# Patient Record
Sex: MALE | Race: WHITE | NOT HISPANIC OR LATINO | Employment: OTHER | URBAN - METROPOLITAN AREA
[De-identification: names, ages, dates, MRNs, and addresses within clinical notes are randomized per-mention and may not be internally consistent; named-entity substitution may affect disease eponyms.]

---

## 2024-07-24 ENCOUNTER — HOSPITAL ENCOUNTER (OUTPATIENT)
Dept: RADIOLOGY | Facility: MEDICAL CENTER | Age: 74
End: 2024-07-24
Payer: COMMERCIAL

## 2024-07-24 ENCOUNTER — HOSPITAL ENCOUNTER (EMERGENCY)
Facility: MEDICAL CENTER | Age: 74
End: 2024-07-25

## 2024-07-24 ENCOUNTER — HOSPITAL ENCOUNTER (INPATIENT)
Facility: MEDICAL CENTER | Age: 74
LOS: 4 days | End: 2024-07-29
Attending: STUDENT IN AN ORGANIZED HEALTH CARE EDUCATION/TRAINING PROGRAM | Admitting: STUDENT IN AN ORGANIZED HEALTH CARE EDUCATION/TRAINING PROGRAM
Payer: COMMERCIAL

## 2024-07-24 DIAGNOSIS — K52.9 IBD (INFLAMMATORY BOWEL DISEASE): ICD-10-CM

## 2024-07-24 DIAGNOSIS — I48.91 ATRIAL FIBRILLATION WITH RVR (HCC): ICD-10-CM

## 2024-07-24 DIAGNOSIS — K56.609 SBO (SMALL BOWEL OBSTRUCTION) (HCC): ICD-10-CM

## 2024-07-24 DIAGNOSIS — K56.609 SMALL BOWEL OBSTRUCTION (HCC): ICD-10-CM

## 2024-07-24 PROCEDURE — 99285 EMERGENCY DEPT VISIT HI MDM: CPT

## 2024-07-24 ASSESSMENT — PAIN DESCRIPTION - PAIN TYPE: TYPE: ACUTE PAIN

## 2024-07-25 ENCOUNTER — HOSPITAL ENCOUNTER (OUTPATIENT)
Dept: RADIOLOGY | Facility: MEDICAL CENTER | Age: 74
End: 2024-07-25
Payer: COMMERCIAL

## 2024-07-25 ENCOUNTER — APPOINTMENT (OUTPATIENT)
Dept: RADIOLOGY | Facility: MEDICAL CENTER | Age: 74
End: 2024-07-25
Payer: COMMERCIAL

## 2024-07-25 ENCOUNTER — APPOINTMENT (OUTPATIENT)
Dept: RADIOLOGY | Facility: MEDICAL CENTER | Age: 74
End: 2024-07-25
Attending: INTERNAL MEDICINE
Payer: COMMERCIAL

## 2024-07-25 PROBLEM — A41.9 SEPSIS (HCC): Status: ACTIVE | Noted: 2024-07-25

## 2024-07-25 PROBLEM — K52.9 IBD (INFLAMMATORY BOWEL DISEASE): Status: ACTIVE | Noted: 2024-07-25

## 2024-07-25 PROBLEM — N28.9 RENAL LESION: Status: ACTIVE | Noted: 2024-07-25

## 2024-07-25 PROBLEM — K56.609 SBO (SMALL BOWEL OBSTRUCTION) (HCC): Status: ACTIVE | Noted: 2024-07-25

## 2024-07-25 PROBLEM — Z71.89 ACP (ADVANCE CARE PLANNING): Status: ACTIVE | Noted: 2024-07-25

## 2024-07-25 PROBLEM — N17.9 AKI (ACUTE KIDNEY INJURY) (HCC): Status: ACTIVE | Noted: 2024-07-25

## 2024-07-25 LAB
ALBUMIN SERPL BCP-MCNC: 3.2 G/DL (ref 3.2–4.9)
ALBUMIN/GLOB SERPL: 1 G/DL
ALP SERPL-CCNC: 106 U/L (ref 30–99)
ALT SERPL-CCNC: 17 U/L (ref 2–50)
ANION GAP SERPL CALC-SCNC: 14 MMOL/L (ref 7–16)
APPEARANCE UR: CLEAR
AST SERPL-CCNC: 14 U/L (ref 12–45)
BASOPHILS # BLD AUTO: 0.4 % (ref 0–1.8)
BASOPHILS # BLD: 0.04 K/UL (ref 0–0.12)
BILIRUB SERPL-MCNC: 0.5 MG/DL (ref 0.1–1.5)
BILIRUB UR QL STRIP.AUTO: NEGATIVE
BUN SERPL-MCNC: 16 MG/DL (ref 8–22)
CALCIUM ALBUM COR SERPL-MCNC: 8.8 MG/DL (ref 8.5–10.5)
CALCIUM SERPL-MCNC: 8.2 MG/DL (ref 8.5–10.5)
CHLORIDE SERPL-SCNC: 102 MMOL/L (ref 96–112)
CO2 SERPL-SCNC: 20 MMOL/L (ref 20–33)
COLOR UR: YELLOW
CREAT SERPL-MCNC: 1.34 MG/DL (ref 0.5–1.4)
CREAT UR-MCNC: 152.76 MG/DL
CRP SERPL HS-MCNC: 3.83 MG/DL (ref 0–0.75)
EKG IMPRESSION: NORMAL
EOSINOPHIL # BLD AUTO: 0 K/UL (ref 0–0.51)
EOSINOPHIL NFR BLD: 0 % (ref 0–6.9)
ERYTHROCYTE [DISTWIDTH] IN BLOOD BY AUTOMATED COUNT: 43.8 FL (ref 35.9–50)
ERYTHROCYTE [SEDIMENTATION RATE] IN BLOOD BY WESTERGREN METHOD: 16 MM/HOUR (ref 0–20)
GFR SERPLBLD CREATININE-BSD FMLA CKD-EPI: 56 ML/MIN/1.73 M 2
GLOBULIN SER CALC-MCNC: 3.3 G/DL (ref 1.9–3.5)
GLUCOSE SERPL-MCNC: 143 MG/DL (ref 65–99)
GLUCOSE UR STRIP.AUTO-MCNC: NEGATIVE MG/DL
HCT VFR BLD AUTO: 40.9 % (ref 42–52)
HGB BLD-MCNC: 13.9 G/DL (ref 14–18)
IMM GRANULOCYTES # BLD AUTO: 0.09 K/UL (ref 0–0.11)
IMM GRANULOCYTES NFR BLD AUTO: 0.8 % (ref 0–0.9)
INR PPP: 1.2 (ref 0.87–1.13)
KETONES UR STRIP.AUTO-MCNC: 40 MG/DL
LACTATE SERPL-SCNC: 1 MMOL/L (ref 0.5–2)
LEUKOCYTE ESTERASE UR QL STRIP.AUTO: NEGATIVE
LIPASE SERPL-CCNC: 18 U/L (ref 11–82)
LYMPHOCYTES # BLD AUTO: 0.56 K/UL (ref 1–4.8)
LYMPHOCYTES NFR BLD: 4.9 % (ref 22–41)
MCH RBC QN AUTO: 29.1 PG (ref 27–33)
MCHC RBC AUTO-ENTMCNC: 34 G/DL (ref 32.3–36.5)
MCV RBC AUTO: 85.7 FL (ref 81.4–97.8)
MICRO URNS: ABNORMAL
MONOCYTES # BLD AUTO: 0.08 K/UL (ref 0–0.85)
MONOCYTES NFR BLD AUTO: 0.7 % (ref 0–13.4)
NEUTROPHILS # BLD AUTO: 10.63 K/UL (ref 1.82–7.42)
NEUTROPHILS NFR BLD: 93.2 % (ref 44–72)
NITRITE UR QL STRIP.AUTO: NEGATIVE
NRBC # BLD AUTO: 0 K/UL
NRBC BLD-RTO: 0 /100 WBC (ref 0–0.2)
PH UR STRIP.AUTO: 6.5 [PH] (ref 5–8)
PLATELET # BLD AUTO: 384 K/UL (ref 164–446)
PMV BLD AUTO: 9.1 FL (ref 9–12.9)
POTASSIUM SERPL-SCNC: 4.2 MMOL/L (ref 3.6–5.5)
PROT SERPL-MCNC: 6.5 G/DL (ref 6–8.2)
PROT UR QL STRIP: NEGATIVE MG/DL
PROTHROMBIN TIME: 15.4 SEC (ref 12–14.6)
RBC # BLD AUTO: 4.77 M/UL (ref 4.7–6.1)
RBC UR QL AUTO: NEGATIVE
SODIUM SERPL-SCNC: 136 MMOL/L (ref 135–145)
SODIUM UR-SCNC: <20 MMOL/L
SP GR UR STRIP.AUTO: >=1.045
UROBILINOGEN UR STRIP.AUTO-MCNC: 0.2 MG/DL
WBC # BLD AUTO: 11.4 K/UL (ref 4.8–10.8)

## 2024-07-25 PROCEDURE — 700111 HCHG RX REV CODE 636 W/ 250 OVERRIDE (IP): Performed by: STUDENT IN AN ORGANIZED HEALTH CARE EDUCATION/TRAINING PROGRAM

## 2024-07-25 PROCEDURE — 770001 HCHG ROOM/CARE - MED/SURG/GYN PRIV*

## 2024-07-25 PROCEDURE — 99497 ADVNCD CARE PLAN 30 MIN: CPT | Mod: 25 | Performed by: STUDENT IN AN ORGANIZED HEALTH CARE EDUCATION/TRAINING PROGRAM

## 2024-07-25 PROCEDURE — 36415 COLL VENOUS BLD VENIPUNCTURE: CPT

## 2024-07-25 PROCEDURE — 700101 HCHG RX REV CODE 250: Performed by: STUDENT IN AN ORGANIZED HEALTH CARE EDUCATION/TRAINING PROGRAM

## 2024-07-25 PROCEDURE — 96376 TX/PRO/DX INJ SAME DRUG ADON: CPT

## 2024-07-25 PROCEDURE — 96365 THER/PROPH/DIAG IV INF INIT: CPT

## 2024-07-25 PROCEDURE — 85652 RBC SED RATE AUTOMATED: CPT

## 2024-07-25 PROCEDURE — 99223 1ST HOSP IP/OBS HIGH 75: CPT | Mod: 25 | Performed by: STUDENT IN AN ORGANIZED HEALTH CARE EDUCATION/TRAINING PROGRAM

## 2024-07-25 PROCEDURE — 85610 PROTHROMBIN TIME: CPT

## 2024-07-25 PROCEDURE — 80053 COMPREHEN METABOLIC PANEL: CPT

## 2024-07-25 PROCEDURE — 700102 HCHG RX REV CODE 250 W/ 637 OVERRIDE(OP): Performed by: STUDENT IN AN ORGANIZED HEALTH CARE EDUCATION/TRAINING PROGRAM

## 2024-07-25 PROCEDURE — A9270 NON-COVERED ITEM OR SERVICE: HCPCS | Performed by: STUDENT IN AN ORGANIZED HEALTH CARE EDUCATION/TRAINING PROGRAM

## 2024-07-25 PROCEDURE — 700105 HCHG RX REV CODE 258: Performed by: STUDENT IN AN ORGANIZED HEALTH CARE EDUCATION/TRAINING PROGRAM

## 2024-07-25 PROCEDURE — 84300 ASSAY OF URINE SODIUM: CPT

## 2024-07-25 PROCEDURE — 82570 ASSAY OF URINE CREATININE: CPT

## 2024-07-25 PROCEDURE — 87040 BLOOD CULTURE FOR BACTERIA: CPT | Mod: 91

## 2024-07-25 PROCEDURE — 86140 C-REACTIVE PROTEIN: CPT

## 2024-07-25 PROCEDURE — 83690 ASSAY OF LIPASE: CPT

## 2024-07-25 PROCEDURE — 85025 COMPLETE CBC W/AUTO DIFF WBC: CPT

## 2024-07-25 PROCEDURE — 83605 ASSAY OF LACTIC ACID: CPT

## 2024-07-25 PROCEDURE — 81003 URINALYSIS AUTO W/O SCOPE: CPT

## 2024-07-25 PROCEDURE — 96375 TX/PRO/DX INJ NEW DRUG ADDON: CPT

## 2024-07-25 PROCEDURE — 93005 ELECTROCARDIOGRAM TRACING: CPT | Performed by: STUDENT IN AN ORGANIZED HEALTH CARE EDUCATION/TRAINING PROGRAM

## 2024-07-25 PROCEDURE — 99222 1ST HOSP IP/OBS MODERATE 55: CPT | Performed by: SURGERY

## 2024-07-25 RX ORDER — TRAZODONE HYDROCHLORIDE 100 MG/1
200 TABLET ORAL NIGHTLY
COMMUNITY

## 2024-07-25 RX ORDER — AMOXICILLIN 250 MG
2 CAPSULE ORAL EVERY EVENING
Status: DISCONTINUED | OUTPATIENT
Start: 2024-07-25 | End: 2024-07-29 | Stop reason: HOSPADM

## 2024-07-25 RX ORDER — ACYCLOVIR 400 MG/1
400 TABLET ORAL 2 TIMES DAILY
COMMUNITY

## 2024-07-25 RX ORDER — METRONIDAZOLE 500 MG/100ML
500 INJECTION, SOLUTION INTRAVENOUS EVERY 12 HOURS
Status: DISCONTINUED | OUTPATIENT
Start: 2024-07-25 | End: 2024-07-27

## 2024-07-25 RX ORDER — POLYETHYLENE GLYCOL 3350 17 G/17G
1 POWDER, FOR SOLUTION ORAL
Status: DISCONTINUED | OUTPATIENT
Start: 2024-07-25 | End: 2024-07-25

## 2024-07-25 RX ORDER — TAMSULOSIN HYDROCHLORIDE 0.4 MG/1
0.4 CAPSULE ORAL
COMMUNITY

## 2024-07-25 RX ORDER — ONDANSETRON 4 MG/1
4 TABLET, ORALLY DISINTEGRATING ORAL EVERY 8 HOURS PRN
Status: ON HOLD | COMMUNITY
End: 2024-07-29

## 2024-07-25 RX ORDER — ONDANSETRON 4 MG/1
4 TABLET, ORALLY DISINTEGRATING ORAL EVERY 4 HOURS PRN
Status: DISCONTINUED | OUTPATIENT
Start: 2024-07-25 | End: 2024-07-29 | Stop reason: HOSPADM

## 2024-07-25 RX ORDER — BUPROPION HYDROCHLORIDE 100 MG/1
100 TABLET ORAL 2 TIMES DAILY
COMMUNITY

## 2024-07-25 RX ORDER — OXYCODONE HYDROCHLORIDE 5 MG/1
5 TABLET ORAL
Status: DISCONTINUED | OUTPATIENT
Start: 2024-07-25 | End: 2024-07-28

## 2024-07-25 RX ORDER — ACETAMINOPHEN 325 MG/1
650 TABLET ORAL EVERY 6 HOURS PRN
Status: DISCONTINUED | OUTPATIENT
Start: 2024-07-25 | End: 2024-07-29 | Stop reason: HOSPADM

## 2024-07-25 RX ORDER — M-VIT,TX,IRON,MINS/CALC/FOLIC 27MG-0.4MG
1 TABLET ORAL DAILY
COMMUNITY

## 2024-07-25 RX ORDER — SODIUM CHLORIDE, SODIUM LACTATE, POTASSIUM CHLORIDE, CALCIUM CHLORIDE 600; 310; 30; 20 MG/100ML; MG/100ML; MG/100ML; MG/100ML
INJECTION, SOLUTION INTRAVENOUS CONTINUOUS
Status: DISCONTINUED | OUTPATIENT
Start: 2024-07-25 | End: 2024-07-29 | Stop reason: HOSPADM

## 2024-07-25 RX ORDER — ONDANSETRON 2 MG/ML
4 INJECTION INTRAMUSCULAR; INTRAVENOUS EVERY 4 HOURS PRN
Status: DISCONTINUED | OUTPATIENT
Start: 2024-07-25 | End: 2024-07-29 | Stop reason: HOSPADM

## 2024-07-25 RX ORDER — SILDENAFIL 100 MG/1
100 TABLET, FILM COATED ORAL
COMMUNITY

## 2024-07-25 RX ORDER — BUSPIRONE HYDROCHLORIDE 10 MG/1
10 TABLET ORAL 2 TIMES DAILY
COMMUNITY

## 2024-07-25 RX ORDER — OXYCODONE HYDROCHLORIDE 10 MG/1
10 TABLET ORAL
Status: DISCONTINUED | OUTPATIENT
Start: 2024-07-25 | End: 2024-07-28

## 2024-07-25 RX ORDER — PANTOPRAZOLE SODIUM 40 MG/10ML
40 INJECTION, POWDER, LYOPHILIZED, FOR SOLUTION INTRAVENOUS 2 TIMES DAILY
Status: DISCONTINUED | OUTPATIENT
Start: 2024-07-25 | End: 2024-07-27

## 2024-07-25 RX ORDER — HYDROMORPHONE HYDROCHLORIDE 1 MG/ML
0.5 INJECTION, SOLUTION INTRAMUSCULAR; INTRAVENOUS; SUBCUTANEOUS
Status: DISCONTINUED | OUTPATIENT
Start: 2024-07-25 | End: 2024-07-28

## 2024-07-25 RX ORDER — LABETALOL HYDROCHLORIDE 5 MG/ML
10 INJECTION, SOLUTION INTRAVENOUS EVERY 4 HOURS PRN
Status: DISCONTINUED | OUTPATIENT
Start: 2024-07-25 | End: 2024-07-29 | Stop reason: HOSPADM

## 2024-07-25 RX ORDER — SODIUM CHLORIDE, SODIUM LACTATE, POTASSIUM CHLORIDE, AND CALCIUM CHLORIDE .6; .31; .03; .02 G/100ML; G/100ML; G/100ML; G/100ML
500 INJECTION, SOLUTION INTRAVENOUS
Status: DISCONTINUED | OUTPATIENT
Start: 2024-07-25 | End: 2024-07-29 | Stop reason: HOSPADM

## 2024-07-25 RX ADMIN — METRONIDAZOLE 500 MG: 500 INJECTION, SOLUTION INTRAVENOUS at 08:45

## 2024-07-25 RX ADMIN — CEFTRIAXONE SODIUM 2000 MG: 10 INJECTION, POWDER, FOR SOLUTION INTRAVENOUS at 20:13

## 2024-07-25 RX ADMIN — PANTOPRAZOLE SODIUM 40 MG: 40 INJECTION, POWDER, FOR SOLUTION INTRAVENOUS at 17:55

## 2024-07-25 RX ADMIN — SENNOSIDES AND DOCUSATE SODIUM 2 TABLET: 50; 8.6 TABLET ORAL at 17:58

## 2024-07-25 RX ADMIN — SODIUM CHLORIDE, POTASSIUM CHLORIDE, SODIUM LACTATE AND CALCIUM CHLORIDE: 600; 310; 30; 20 INJECTION, SOLUTION INTRAVENOUS at 18:58

## 2024-07-25 RX ADMIN — HYDROMORPHONE HYDROCHLORIDE 0.5 MG: 1 INJECTION, SOLUTION INTRAMUSCULAR; INTRAVENOUS; SUBCUTANEOUS at 18:23

## 2024-07-25 RX ADMIN — SODIUM CHLORIDE, POTASSIUM CHLORIDE, SODIUM LACTATE AND CALCIUM CHLORIDE: 600; 310; 30; 20 INJECTION, SOLUTION INTRAVENOUS at 03:08

## 2024-07-25 RX ADMIN — PANTOPRAZOLE SODIUM 40 MG: 40 INJECTION, POWDER, FOR SOLUTION INTRAVENOUS at 05:17

## 2024-07-25 RX ADMIN — METRONIDAZOLE 500 MG: 500 INJECTION, SOLUTION INTRAVENOUS at 20:15

## 2024-07-25 RX ADMIN — HYDROMORPHONE HYDROCHLORIDE 0.5 MG: 1 INJECTION, SOLUTION INTRAMUSCULAR; INTRAVENOUS; SUBCUTANEOUS at 12:42

## 2024-07-25 RX ADMIN — HYDROMORPHONE HYDROCHLORIDE 0.5 MG: 1 INJECTION, SOLUTION INTRAMUSCULAR; INTRAVENOUS; SUBCUTANEOUS at 05:22

## 2024-07-25 RX ADMIN — OXYCODONE HYDROCHLORIDE 10 MG: 10 TABLET ORAL at 09:44

## 2024-07-25 ASSESSMENT — PAIN DESCRIPTION - PAIN TYPE
TYPE: ACUTE PAIN

## 2024-07-25 ASSESSMENT — ENCOUNTER SYMPTOMS
DEPRESSION: 0
ABDOMINAL PAIN: 1
CHILLS: 0
PALPITATIONS: 0
COUGH: 0
FOCAL WEAKNESS: 0
HEADACHES: 0
DIZZINESS: 0
WEAKNESS: 1
BRUISES/BLEEDS EASILY: 0
VOMITING: 1
FEVER: 0
DOUBLE VISION: 0
SHORTNESS OF BREATH: 0
BLURRED VISION: 0
MYALGIAS: 1
NAUSEA: 1
HEARTBURN: 1

## 2024-07-25 ASSESSMENT — LIFESTYLE VARIABLES: SUBSTANCE_ABUSE: 0

## 2024-07-25 NOTE — CONSULTS
"  DATE OF CONSULTATION:  7/25/2024     REFERRING PHYSICIAN:   Manuel Kearney M.D.     CONSULTING PHYSICIAN:  Enedina Portillo M.D.     REASON FOR CONSULTATION:  I have been asked by Dr. Kearney to see the patient in surgical consultation for evaluation of SBO.    HISTORY OF PRESENT ILLNESS: The patient is a 74  year-old White man who presents to the Emergency Department with a 2-day history of moderate generalized abdominal pain. The pain is associated with nausea and vomiting. The patient denies any recent or intercurrent illness. The patient has undergone colectomy and SB reservoir 30 years ago. The patient denies any previous surgery for obstructive symptoms.. He has had admissions in past for SBOs treated non operatively.    PAST MEDICAL HISTORY: Colitis/IBD    PAST SURGICAL HISTORY: Colectomy with continent reservoir.    ALLERGIES:   Allergies   Allergen Reactions    Amoxicillin Unspecified     \"It makes my ostomy bleed\"       CURRENT MEDICATIONS:    Home Medications       Reviewed by Joselin Navarro (Pharmacy Tech) on 07/25/24 at 0522  Med List Status: Complete     Medication Last Dose Status   acyclovir (ZOVIRAX) 400 MG tablet 7/24/2024 Active   buPROPion (WELLBUTRIN) 100 MG Tab 7/24/2024 Active   busPIRone (BUSPAR) 10 MG Tab tablet 7/24/2024 Active   LACTOBACILLUS PO 7/24/2024 Active   omeprazole (PRILOSEC) 20 MG delayed-release capsule 7/24/2024 Active   ondansetron (ZOFRAN ODT) 4 MG TABLET DISPERSIBLE PRN Active   sildenafil citrate (VIAGRA) 100 MG tablet PRN Active   tamsulosin (FLOMAX) 0.4 MG capsule UNK Active   traZODone (DESYREL) 100 MG Tab 7/23/2024 Active                    FAMILY HISTORY: family history is not on file.    SOCIAL HISTORY:      REVIEW OF SYSTEMS: Comprehensive review of systems is negative with the exception of the aforementioned HPI, PMH, and PSH bullets in accordance with CMS guidelines.    PHYSICAL EXAMINATION:    Physical Exam  Cardiovascular:      Rate and Rhythm: Normal rate. "   Pulmonary:      Effort: Pulmonary effort is normal.   Abdominal:      General: There is no distension.      Palpations: Abdomen is soft.      Tenderness: There is no abdominal tenderness.      Comments: [place] ostomy in RLQ   Musculoskeletal:         General: Normal range of motion.      Cervical back: Neck supple.   Skin:     General: Skin is warm.   Neurological:      General: No focal deficit present.      Mental Status: He is alert.         LABORATORY VALUES:   Recent Labs     07/25/24  0113   WBC 11.4*   RBC 4.77   HEMOGLOBIN 13.9*   HEMATOCRIT 40.9*   MCV 85.7   MCH 29.1   MCHC 34.0   RDW 43.8   PLATELETCT 384   MPV 9.1     Recent Labs     07/25/24  0113   SODIUM 136   POTASSIUM 4.2   CHLORIDE 102   CO2 20   GLUCOSE 143*   BUN 16   CREATININE 1.34   CALCIUM 8.2*     Recent Labs     07/25/24 0113 07/25/24  0204   ASTSGOT 14  --    ALTSGPT 17  --    TBILIRUBIN 0.5  --    ALKPHOSPHAT 106*  --    GLOBULIN 3.3  --    INR  --  1.20*     Recent Labs     07/25/24 0204   INR 1.20*        IMAGING:   No orders to display       ASSESSMENT AND PLAN:     * SBO (small bowel obstruction) (HCC)- (present on admission)  Assessment & Plan  Hx of IBD  SP colectomy and has reservoir  NV  [facility]  [facility] to [facility] for tx  CT still not loaded in chart to review  Non op management until eval CT        DISPOSITION: Medical evaluation and admission. The patient was admitted to the Medical Service prior to surgical consultation. [facility] Acute Care Surgery [place] Service will follow.     ____________________________________     Enedina Portillo M.D.    DD: 7/25/2024  6:03 AM    AAST Grading System for EGS Conditions  ACS NSQIP Surgical Risk Calculator

## 2024-07-25 NOTE — ED NOTES
Med rec complete per patient/home pharmacy  Allergies reviewed.   Outpatient antibiotics in the last 30 days? No   Anticoagulants taken in the last 14 days? No     [facility] reports [facility] Tamsulosin 0.4 daily, patient unsure if taking, medication did not sound familiar to him.    Pharmacy patient utilizes: [facility] [place]

## 2024-07-25 NOTE — ASSESSMENT & PLAN NOTE
This is Sepsis Present on admission  SIRS criteria identified on my evaluation include: Tachycardia, with heart rate greater than 90 BPM, Leukocytosis, with WBC greater than 12,000, and Bandemia, greater than 10% bands  Clinical indicators of end organ dysfunction include Lactic Acid greater than 2  Source is GI  Sepsis protocol initiated  Crystalloid Fluid Administration: Fluid resuscitation ordered per standard protocol - 30 mL/kg per current or ideal body weight  IV antibiotics as appropriate for source of sepsis  Reassessment: I have reassessed the patient's hemodynamic status    WBC 16.48k, LA 2.5  IVF  Abx: ceftriaxone, flagyl  Follow cultures

## 2024-07-25 NOTE — H&P
"Hospital Medicine History & Physical Note    Date of Service  7/25/2024    Primary Care Physician  No primary care provider on file.    Consultants  Surgery (Dr. Portillo)    Code Status  DNAR/DNI    Chief Complaint  Chief Complaint   Patient presents with    Abdominal Pain     [facility] with abd pain, CT revealed bowel obstruction. NG tube placed, pt given 0.5 mg dilaudid, 2L IVF, and \"unknown antibiotics\" x 2 PTA.        History of Presenting Illness  Babar Yuan is a 74 y.o. male with history of sanjiv continent intestinal reservoir (BCIR) 30years ago for colitis who presented 7/24/2024 [facility] for higher level of care. Patient reported having several days of nausea and vomiting, generalized weakness.  Therefore [facility], [facility] ER for higher level care, colorectal rectal surgery evaluation.    [facility] included:  CBC: WBC 16.48, hemoglobin 17.1, platelet 469  CMP: Sodium 133, potassium 4.4, chloride 94, bicarb 20, glucose 130, BUN 70, creatinine 1.6, calcium 10.4, total protein 8.3, albumin 4.1, alkaline phosphatase 124, AST 21, ALT 19, total bilirubin 1.1  Phosphorus 2.8  Magnesium 1.8  Lactic acid 2.5  Lipase 22  Procalcitonin 0.07  Beta hydroxybutyrate acid 2.0    CTAP with contrast: Markedly dilated distal bowel loops including dilatation of the iliac pouch increased from prior, with transition occurring at the distal pouch anastomotic suture.  Findings suggest obstruction, surgical consultation recommended.  A complex 1 cm left upper pole renal lesion suspicious for neoplasm such as a renal cell carcinoma.  Consider renal protocol imaging further evaluation.    Patient was given IVF antibiotic prior to transfer.  [facility]. At time of my evaluation, patient appears comfortable in no acute distress.  Surgery has been consulted, formal evaluation to follow.    I " "discussed the plan of care with patient, bedside RN, and pharmacy.    Review of Systems  Review of Systems   Constitutional:  Negative for chills and fever.   HENT:  Negative for hearing loss and tinnitus.    Eyes:  Negative for blurred vision and double vision.   Respiratory:  Negative for cough and shortness of breath.    Cardiovascular:  Negative for chest pain and palpitations.   Gastrointestinal:  Positive for abdominal pain, heartburn, nausea and vomiting.   Genitourinary:  Negative for dysuria and urgency.   Musculoskeletal:  Positive for myalgias. Negative for joint pain.   Skin:  Negative for itching and rash.   Neurological:  Positive for weakness. Negative for dizziness, focal weakness and headaches.   Endo/Heme/Allergies:  Negative for environmental allergies. Does not bruise/bleed easily.   Psychiatric/Behavioral:  Negative for depression and substance abuse.    All other systems reviewed and are negative.      Past Medical History   has no past medical history on file.  Colitis    Surgical History   has no past surgical history on file.   sanjiv continent intestinal reservoir    Family History  family history is not on file.   Family history reviewed with patient. There is no family history that is pertinent to the chief complaint.     Social History       Allergies  Allergies   Allergen Reactions    Amoxicillin Unspecified     \"It makes my ostomy bleed\"       Medications  None       Physical Exam  Temp:  [36.6 °C (97.9 °F)] 36.6 °C (97.9 °F)  Pulse:  [60-87] 60  Resp:  [14-20] 16  BP: (112-134)/(60-70) 117/63  SpO2:  [92 %-95 %] 95 %  Blood Pressure : 128/60   Temperature: 36.6 °C (97.9 °F)   Pulse: 82   Respiration: 20   Pulse Oximetry: 93 %       Physical Exam  Vitals and nursing note reviewed.   Constitutional:       General: He is not in acute distress.  HENT:      Head: Normocephalic and atraumatic.      Nose: Nose normal.      Comments: NG in place     Mouth/Throat:      Mouth: Mucous membranes " "are dry.      Pharynx: Oropharynx is clear.   Eyes:      General: No scleral icterus.     Extraocular Movements: Extraocular movements intact.   Cardiovascular:      Rate and Rhythm: Normal rate and regular rhythm.      Pulses: Normal pulses.      Heart sounds:      No friction rub.   Pulmonary:      Effort: No respiratory distress.      Breath sounds: Rales present. No wheezing.   Chest:      Chest wall: No tenderness.   Abdominal:      General: There is distension.      Tenderness: There is abdominal tenderness. There is no guarding or rebound.   Musculoskeletal:         General: Normal range of motion.      Cervical back: Neck supple. No tenderness.      Right lower leg: No edema.      Left lower leg: No edema.   Skin:     General: Skin is warm and dry.      Capillary Refill: Capillary refill takes less than 2 seconds.   Neurological:      General: No focal deficit present.      Mental Status: He is alert and oriented to person, place, and time.   Psychiatric:         Mood and Affect: Mood normal.         Laboratory:  Recent Labs     07/25/24  0113   WBC 11.4*   RBC 4.77   HEMOGLOBIN 13.9*   HEMATOCRIT 40.9*   MCV 85.7   MCH 29.1   MCHC 34.0   RDW 43.8   PLATELETCT 384   MPV 9.1     Recent Labs     07/25/24  0113   SODIUM 136   POTASSIUM 4.2   CHLORIDE 102   CO2 20   GLUCOSE 143*   BUN 16   CREATININE 1.34   CALCIUM 8.2*     Recent Labs     07/25/24  0113   ALTSGPT 17   ASTSGOT 14   ALKPHOSPHAT 106*   TBILIRUBIN 0.5   LIPASE 18   GLUCOSE 143*     Recent Labs     07/25/24  0204   INR 1.20*     No results for input(s): \"NTPROBNP\" in the last 72 hours.      No results for input(s): \"TROPONINT\" in the last 72 hours.    Imaging:  No orders to display       no X-Ray or EKG requiring interpretation    Assessment/Plan:  Justification for Admission Status  I anticipate this patient will require at least 2 midnights hospitalization, therefore appropriate for inpatient status      * SBO (small bowel obstruction) ([facility])- " (present on admission)  Assessment & Plan  [facility]  Pt had BCIR 30years ago for IBD/colitis    NPO, bowel rest  IV Protonix  Supportive pain control  NG decompression  Surgery consulted, follow-up appreciated    IBD (inflammatory bowel disease)  Assessment & Plan  Resume home medications once able to verify    Sepsis ([facility])  Assessment & Plan  This is Sepsis Present on admission  SIRS criteria identified on my evaluation include: Tachycardia, with heart rate greater than 90 BPM, Leukocytosis, with WBC greater than 12,000, and Bandemia, greater than 10% bands  Clinical indicators of end organ dysfunction include Lactic Acid greater than 2  Source is GI  Sepsis protocol initiated  Crystalloid Fluid Administration: Fluid resuscitation ordered per standard protocol - 30 mL/kg per current or ideal body weight  IV antibiotics as appropriate for source of sepsis  Reassessment: I have reassessed the patient's hemodynamic status    WBC 16.48k, LA 2.5  IVF  Abx: ceftriaxone, flagyl  Follow cultures    TRISTAN (acute kidney injury) ([facility])  Assessment & Plan  Creatinine 1.6, unknown baseline  IVF  Minimize nephrotoxic agents, renally dose meds  Check FeNa    Renal lesion  Assessment & Plan  Left sided 1cm renal lesion seen on CT AP  Discussed with patient, he is already aware of this. I advised to follow up with PCP outpatient, he expressed understanding    ACP (advance care planning)  Assessment & Plan  Goal care discussed with patient in ER.  He stated he does not wish to be kept alive on a machine in a vegetative state.  He also clarified he has advanced directive POLST at home.  Patient is agreeable for no invasive or heroic life-sustaining measures-no CPR/defibrillation/intubation mechanical ventilation.  He is however agreeable for [place] invasive medical management, including IVF, IV analgesia, IV antibiotic as clinically warranted, as well as evaluation by surgery service.  Diagnosis, prognosis, question  and concern addressed.  DNR/denies status confirmed.  ACP: 17 minutes        VTE prophylaxis: SCDs/TEDs

## 2024-07-25 NOTE — ASSESSMENT & PLAN NOTE
Creatinine 1.6, unknown baseline  IVF  Minimize nephrotoxic agents, renally dose meds  Check FeNa  Cr- now 1.3 improved

## 2024-07-25 NOTE — ED NOTES
Dr Mar bedside at this time.  ABX admin per MAR.  Pt resting comfortably.  Updated to POC, all needs met.

## 2024-07-25 NOTE — ED NOTES
1505  pt's daughter requesting results of testing[facility]records [facility].  Dr Mar aware via [facility] and spoke w/ daughter per phone.  Spoke w/ film room re: CT from [facility], aware pt had duplicate chart, has been marked for merge, CT uploaded on alternate MR#.      1639  Bed assigned to T3, report called, pt to the floor w/ transport.  250cc output from NG tube on this shift.

## 2024-07-25 NOTE — ASSESSMENT & PLAN NOTE
[facility]  Pt had BCIR 30years ago for IBD/colitis    NPO, bowel rest  IV Protonix  Supportive pain control  NG decompression  Surgery consulted, GI consulted enteroscopy through stoma recommended tonight  S/p enteroscopy with [facility].

## 2024-07-25 NOTE — ED PROVIDER NOTES
"  ER Provider Note    Scribed for Lanette Kearney M.D. by Kait Marley. 7/25/2024   1:36 AM    Primary Care Provider: No primary care provider noted.     CHIEF COMPLAINT  Chief Complaint   Patient presents with    Abdominal Pain     [facility], pt presented with abd pain, CT revealed bowel obstruction. NG tube placed, pt given 0.5 mg dilaudid, 2L IVF, and \"unknown antibiotics\" x 2 PTA.      EXTERNAL RECORDS REVIEWED  Inpatient Notes  Patient has a history of [place] continent intestinal reservoirs with a history of IBD. History suggests colitis. He underwent total colectomy without previous obstruction. Recent labs showed a creatine level of 1.6 with no elevation of BUN. The white blood cell count was 16.5. CT marked dilatation of distal bowel loops with iliac pouch increasing prior to transformation occurring at the distal pouch anastomosis sutures, findings suggest obstruction. Additionally, there is a noted 1 cm left upper pole renal lesion suspicion for neoplasm as renal cell carcinoma. He was referred to urology for follow-up. He received 2 liters of normal saline and was administered CTX Flagyl for elevated white blood cell count. Nasogastric tube placed; general surgery consulted but deemed the case too complicated and transferred pt for subspecialty evaluation    HPI/ROS  LIMITATION TO HISTORY   Select: None  OUTSIDE HISTORIAN(S):  None    Babar Yuan is a 74 y.o. male who presents to the ED for evaluation of abdominal pain onset prior to arrival. The patient explains he has been experiencing gas and lower abdominal pain, which he finds unbearable and has been ongoing for a few weeks. He mentions episodes of vomiting. The patient states prior colitis, which requires him to catheterize for bowel movements. He had surgery for this condition 30 years ago in [place] and denies any recent issues with it. He admits to not chewing his carrots thoroughly, which he was told a cause of an " "obstruction when [facility] visited [facility]. He denies feeling nauseous.    PAST MEDICAL HISTORY  No past medical history noted.     SURGICAL HISTORY  No past surgical history noted.   FAMILY HISTORY  No family history noted.   SOCIAL HISTORY       CURRENT MEDICATIONS  Previous Medications    No medications noted.        ALLERGIES  Allergies   Allergen Reactions    Amoxicillin Unspecified     \"It makes my ostomy bleed\"        PHYSICAL EXAM  /60   Pulse 82   Temp 36.6 °C (97.9 °F) (Oral)   Resp 20   Ht 1.651 m (5' 5\")   Wt 60.3 kg (133 lb)   SpO2 93%   BMI 22.13 kg/m²    General: Pleasant male, chronically ill-appearing, alert  Head: Normocephalic atraumatic  Eyes: Extraocular motion intact  Neck: Supple, no rigidity  Cardiovascular: Regular rate and rhythm no murmurs rubs or gallops  Respiratory: Clear to auscultation bilaterally, equal chest rise and fall, no increased work of breathing  Abdomen: Soft, mildly distended, right lower quadrant has a sinus tract, which patient reports he catheterizes intermittently to relieve stool.  Musculoskeletal: Warm and well perfused, no peripheral edema  Neuro: Alert, no focal deficits  Integumentary: No wounds or rashes    DIAGNOSTIC STUDIES    Labs:   Labs Reviewed   CBC WITH DIFFERENTIAL - Abnormal; Notable for the following components:       Result Value    WBC 11.4 (*)     Hemoglobin 13.9 (*)     Hematocrit 40.9 (*)     Neutrophils-Polys 93.20 (*)     Lymphocytes 4.90 (*)     Neutrophils (Absolute) 10.63 (*)     Lymphs (Absolute) 0.56 (*)     All other components within normal limits   COMP METABOLIC PANEL - Abnormal; Notable for the following components:    Glucose 143 (*)     Calcium 8.2 (*)     Alkaline Phosphatase 106 (*)     All other components within normal limits   ESTIMATED GFR - Abnormal; Notable for the following components:    GFR (CKD-EPI) 56 (*)     All other components within normal limits   LACTIC ACID   LIPASE   PROTHROMBIN TIME   BLOOD CULTURE   BLOOD " CULTURE   URINALYSIS   CRP QUANTITIVE (NON-CARDIAC)   SED RATE   URINE SODIUM RANDOM   URINE CREATININE RANDOM   Mild leukocytosis, improved from outside facility, where was 16.5, patient has chronic anemia, remainder labs, unremarkable.    INITIAL ASSESSMENT COURSE AND PLAN  Care Narrative  Patient was evaluated at bedside. The patient presents to the ED for abdominal pain. I discussed plan of admission. Patient verbalizes understanding and support with my plan of care.  Differential diagnoses include but not limited to: Small bowel obstruction, stricture of pouch outlet, dehydration    Discussed with Dr. Portillo who recommends medicine admission, they will consult/follow    1:53 AM- I spoke Dr. Robertson (Internal Medicine) for plan of admission. He was agreeable.     DISPOSITION AND DISCUSSIONS    I have discussed management of the patient with the following physicians and JOHNNY's:  Dr. Robertson (Internal Medicine).  Dr. Portillo,, general surgery    Discussion of management with other QHP or appropriate source(s): None       Barriers to care at this time, including but not limited to: Patient does not have established PCP.     Decision tools and prescription drugs considered including, but not limited to: Pain Medications hydromorphone .    DISPOSITION:  Patient will be hospitalized by Dr. Robertson in guarded condition.    FINAL DIAGNOSIS  1. Small bowel obstruction (HCC)      Kait MILLAN (Scribe), am scribing for, and in the presence of, Manuel Kearney M.D..    Electronically signed by: Kait Marley ([place]), 7/25/2024    Manuel MILLAN M.D. personally performed the services described in this documentation, as scribed by Kait Marley in my presence, and it is both accurate and complete.      The note accurately reflects work and decisions made by me.  Manuel Kearney M.D.  7/25/2024  6:42 AM

## 2024-07-25 NOTE — PROGRESS NOTES
Mr. Babar Yuan is a 74 y.o. male who was admitted this morning by my colleague Dr. Dmitry Robertson.  I personally reviewed the H&P, labs and imaging.  For full details please refer to H&P.  In summary    Patient was seen and examined at bedside.      Babar Yuan is a 74 y.o. male who presented on 7/24/2024 with abdominal pain, nausea, vomiting, weakness.  CT head and pelvis is concerning for small bowel obstruction.  Started ceftriaxone, metronidazole.  NG tube to suction.  Continue IV fluids and bowel rest.  General surgery is following.

## 2024-07-25 NOTE — ASSESSMENT & PLAN NOTE
Left sided 1cm renal lesion seen on CT AP  Discussed with patient, he is already aware of this. I advised to follow up with PCP outpatient, he expressed understanding  Follow up with Urology for this.

## 2024-07-25 NOTE — ASSESSMENT & PLAN NOTE
Goal care discussed with patient in ER.  He stated he does not wish to be kept alive on a machine in a vegetative state.  He also clarified he has advanced directive POLST at home.  Patient is agreeable for no invasive or heroic life-sustaining measures-no CPR/defibrillation/intubation mechanical ventilation.  He is however agreeable for [place] invasive medical management, including IVF, IV analgesia, IV antibiotic as clinically warranted, as well as evaluation by surgery service.  Diagnosis, prognosis, question and concern addressed.  DNR/denies status confirmed.  ACP: 17 minutes

## 2024-07-25 NOTE — ED NOTES
Bedside report received from off going RN/tech: Blade RN, assumed care of patient.  POC discussed with patient. Call light within reach, all needs addressed at this time.       Fall risk interventions in place: Patient's personal possessions are with in their safe reach, Place fall risk sign on patient's door, Give patient urinal if applicable, and Keep floor surfaces clean and dry (all applicable per [place] Fall risk assessment)   Continuous monitoring: Pulse Ox or Blood Pressure  IVF/IV medications: Infusion per MAR (List Med(s)) LR 100ml/hour  Oxygen: Room Air  Bedside sitter: Not Applicable   Isolation: Not Applicable

## 2024-07-26 ENCOUNTER — ANESTHESIA EVENT (OUTPATIENT)
Dept: SURGERY | Facility: MEDICAL CENTER | Age: 74
End: 2024-07-26
Payer: COMMERCIAL

## 2024-07-26 ENCOUNTER — ANESTHESIA (OUTPATIENT)
Dept: SURGERY | Facility: MEDICAL CENTER | Age: 74
End: 2024-07-26
Payer: COMMERCIAL

## 2024-07-26 LAB
ALBUMIN SERPL BCP-MCNC: 2.9 G/DL (ref 3.2–4.9)
BASOPHILS # BLD AUTO: 0.3 % (ref 0–1.8)
BASOPHILS # BLD: 0.03 K/UL (ref 0–0.12)
BUN SERPL-MCNC: 18 MG/DL (ref 8–22)
CALCIUM ALBUM COR SERPL-MCNC: 9.2 MG/DL (ref 8.5–10.5)
CALCIUM SERPL-MCNC: 8.3 MG/DL (ref 8.5–10.5)
CHLORIDE SERPL-SCNC: 104 MMOL/L (ref 96–112)
CO2 SERPL-SCNC: 23 MMOL/L (ref 20–33)
CREAT SERPL-MCNC: 1.28 MG/DL (ref 0.5–1.4)
EOSINOPHIL # BLD AUTO: 0.03 K/UL (ref 0–0.51)
EOSINOPHIL NFR BLD: 0.3 % (ref 0–6.9)
ERYTHROCYTE [DISTWIDTH] IN BLOOD BY AUTOMATED COUNT: 45.8 FL (ref 35.9–50)
GFR SERPLBLD CREATININE-BSD FMLA CKD-EPI: 59 ML/MIN/1.73 M 2
GLUCOSE SERPL-MCNC: 95 MG/DL (ref 65–99)
HCT VFR BLD AUTO: 39.9 % (ref 42–52)
HGB BLD-MCNC: 13.3 G/DL (ref 14–18)
IMM GRANULOCYTES # BLD AUTO: 0.07 K/UL (ref 0–0.11)
IMM GRANULOCYTES NFR BLD AUTO: 0.7 % (ref 0–0.9)
LYMPHOCYTES # BLD AUTO: 1.71 K/UL (ref 1–4.8)
LYMPHOCYTES NFR BLD: 18.1 % (ref 22–41)
MAGNESIUM SERPL-MCNC: 1.9 MG/DL (ref 1.5–2.5)
MCH RBC QN AUTO: 29 PG (ref 27–33)
MCHC RBC AUTO-ENTMCNC: 33.3 G/DL (ref 32.3–36.5)
MCV RBC AUTO: 87.1 FL (ref 81.4–97.8)
MONOCYTES # BLD AUTO: 0.79 K/UL (ref 0–0.85)
MONOCYTES NFR BLD AUTO: 8.4 % (ref 0–13.4)
NEUTROPHILS # BLD AUTO: 6.8 K/UL (ref 1.82–7.42)
NEUTROPHILS NFR BLD: 72.2 % (ref 44–72)
NRBC # BLD AUTO: 0 K/UL
NRBC BLD-RTO: 0 /100 WBC (ref 0–0.2)
PHOSPHATE SERPL-MCNC: 2.8 MG/DL (ref 2.5–4.5)
PLATELET # BLD AUTO: 328 K/UL (ref 164–446)
PMV BLD AUTO: 9 FL (ref 9–12.9)
POTASSIUM SERPL-SCNC: 3.6 MMOL/L (ref 3.6–5.5)
RBC # BLD AUTO: 4.58 M/UL (ref 4.7–6.1)
SODIUM SERPL-SCNC: 137 MMOL/L (ref 135–145)
WBC # BLD AUTO: 9.4 K/UL (ref 4.8–10.8)

## 2024-07-26 PROCEDURE — 700101 HCHG RX REV CODE 250: Performed by: STUDENT IN AN ORGANIZED HEALTH CARE EDUCATION/TRAINING PROGRAM

## 2024-07-26 PROCEDURE — 700105 HCHG RX REV CODE 258: Performed by: STUDENT IN AN ORGANIZED HEALTH CARE EDUCATION/TRAINING PROGRAM

## 2024-07-26 PROCEDURE — 99418 PROLNG IP/OBS E/M EA 15 MIN: CPT | Performed by: INTERNAL MEDICINE

## 2024-07-26 PROCEDURE — 83735 ASSAY OF MAGNESIUM: CPT

## 2024-07-26 PROCEDURE — 770001 HCHG ROOM/CARE - MED/SURG/GYN PRIV*

## 2024-07-26 PROCEDURE — 700102 HCHG RX REV CODE 250 W/ 637 OVERRIDE(OP): Performed by: STUDENT IN AN ORGANIZED HEALTH CARE EDUCATION/TRAINING PROGRAM

## 2024-07-26 PROCEDURE — 99999 PR NO CHARGE: CPT | Performed by: NURSE PRACTITIONER

## 2024-07-26 PROCEDURE — A9270 NON-COVERED ITEM OR SERVICE: HCPCS

## 2024-07-26 PROCEDURE — 700111 HCHG RX REV CODE 636 W/ 250 OVERRIDE (IP): Performed by: STUDENT IN AN ORGANIZED HEALTH CARE EDUCATION/TRAINING PROGRAM

## 2024-07-26 PROCEDURE — 36415 COLL VENOUS BLD VENIPUNCTURE: CPT

## 2024-07-26 PROCEDURE — A9270 NON-COVERED ITEM OR SERVICE: HCPCS | Performed by: INTERNAL MEDICINE

## 2024-07-26 PROCEDURE — 80069 RENAL FUNCTION PANEL: CPT

## 2024-07-26 PROCEDURE — A9270 NON-COVERED ITEM OR SERVICE: HCPCS | Performed by: STUDENT IN AN ORGANIZED HEALTH CARE EDUCATION/TRAINING PROGRAM

## 2024-07-26 PROCEDURE — 700102 HCHG RX REV CODE 250 W/ 637 OVERRIDE(OP)

## 2024-07-26 PROCEDURE — 99233 SBSQ HOSP IP/OBS HIGH 50: CPT | Mod: 25 | Performed by: INTERNAL MEDICINE

## 2024-07-26 PROCEDURE — 85025 COMPLETE CBC W/AUTO DIFF WBC: CPT

## 2024-07-26 PROCEDURE — 99223 1ST HOSP IP/OBS HIGH 75: CPT | Performed by: INTERNAL MEDICINE

## 2024-07-26 PROCEDURE — 700102 HCHG RX REV CODE 250 W/ 637 OVERRIDE(OP): Performed by: INTERNAL MEDICINE

## 2024-07-26 RX ADMIN — THERA TABS 1 TABLET: TAB at 17:57

## 2024-07-26 RX ADMIN — METRONIDAZOLE 500 MG: 500 INJECTION, SOLUTION INTRAVENOUS at 21:49

## 2024-07-26 RX ADMIN — OXYCODONE HYDROCHLORIDE 10 MG: 10 TABLET ORAL at 18:06

## 2024-07-26 RX ADMIN — HYDROMORPHONE HYDROCHLORIDE 0.5 MG: 1 INJECTION, SOLUTION INTRAMUSCULAR; INTRAVENOUS; SUBCUTANEOUS at 12:32

## 2024-07-26 RX ADMIN — HYDROMORPHONE HYDROCHLORIDE 0.5 MG: 1 INJECTION, SOLUTION INTRAMUSCULAR; INTRAVENOUS; SUBCUTANEOUS at 09:00

## 2024-07-26 RX ADMIN — CEFTRIAXONE SODIUM 2000 MG: 10 INJECTION, POWDER, FOR SOLUTION INTRAVENOUS at 22:39

## 2024-07-26 RX ADMIN — METRONIDAZOLE 500 MG: 500 INJECTION, SOLUTION INTRAVENOUS at 09:05

## 2024-07-26 RX ADMIN — SODIUM CHLORIDE, POTASSIUM CHLORIDE, SODIUM LACTATE AND CALCIUM CHLORIDE: 600; 310; 30; 20 INJECTION, SOLUTION INTRAVENOUS at 05:26

## 2024-07-26 RX ADMIN — HYDROMORPHONE HYDROCHLORIDE 0.5 MG: 1 INJECTION, SOLUTION INTRAMUSCULAR; INTRAVENOUS; SUBCUTANEOUS at 05:20

## 2024-07-26 RX ADMIN — ONDANSETRON 4 MG: 2 INJECTION INTRAMUSCULAR; INTRAVENOUS at 00:29

## 2024-07-26 RX ADMIN — OXYCODONE HYDROCHLORIDE 10 MG: 10 TABLET ORAL at 21:36

## 2024-07-26 RX ADMIN — PANTOPRAZOLE SODIUM 40 MG: 40 INJECTION, POWDER, FOR SOLUTION INTRAVENOUS at 05:11

## 2024-07-26 RX ADMIN — HYDROMORPHONE HYDROCHLORIDE 0.5 MG: 1 INJECTION, SOLUTION INTRAMUSCULAR; INTRAVENOUS; SUBCUTANEOUS at 00:26

## 2024-07-26 RX ADMIN — TRAZODONE HYDROCHLORIDE 200 MG: 50 TABLET ORAL at 21:37

## 2024-07-26 RX ADMIN — ONDANSETRON 4 MG: 4 TABLET, ORALLY DISINTEGRATING ORAL at 21:40

## 2024-07-26 RX ADMIN — PANTOPRAZOLE SODIUM 40 MG: 40 INJECTION, POWDER, FOR SOLUTION INTRAVENOUS at 17:57

## 2024-07-26 RX ADMIN — ONDANSETRON 4 MG: 2 INJECTION INTRAMUSCULAR; INTRAVENOUS at 21:50

## 2024-07-26 ASSESSMENT — PAIN DESCRIPTION - PAIN TYPE
TYPE: ACUTE PAIN

## 2024-07-26 ASSESSMENT — ENCOUNTER SYMPTOMS
COUGH: 0
INSOMNIA: 0
TREMORS: 0
CHILLS: 0
WHEEZING: 0
MYALGIAS: 0
HEADACHES: 0
BLOOD IN STOOL: 0
WEAKNESS: 0
DIARRHEA: 0
FALLS: 0
PALPITATIONS: 0
NERVOUS/ANXIOUS: 0
HEMOPTYSIS: 0
VOMITING: 1
SEIZURES: 0
EYE PAIN: 0
NAUSEA: 1
EYE REDNESS: 0
FOCAL WEAKNESS: 0
ABDOMINAL PAIN: 1
LOSS OF CONSCIOUSNESS: 0
SHORTNESS OF BREATH: 0
FEVER: 0
CONSTIPATION: 0
DIZZINESS: 0

## 2024-07-26 NOTE — CARE PLAN
The patient is Stable - Low risk of patient condition declining or worsening    Shift Goals  Clinical Goals: pain control, ng tube placement  Patient Goals: comfort, rest  Family Goals: not present    Progress made toward(s) clinical / shift goals:    Problem: Pain - Standard  Goal: Alleviation of pain or a reduction in pain to the patient’s comfort goal  Outcome: Progressing     Problem: Knowledge Deficit - Standard  Goal: Patient and family/care givers will demonstrate understanding of plan of care, disease process/condition, diagnostic tests and medications  Outcome: Progressing     Problem: Hemodynamics  Goal: Patient's hemodynamics, fluid balance and neurologic status will be stable or improve  Outcome: Progressing     Problem: Fluid Volume  Goal: Fluid volume balance will be maintained  Outcome: Progressing     Problem: Urinary - Renal Perfusion  Goal: Ability to achieve and maintain adequate renal perfusion and functioning will improve  Outcome: Progressing     Problem: Respiratory  Goal: Patient will achieve/maintain optimum respiratory ventilation and gas exchange  Outcome: Progressing     Problem: Mechanical Ventilation  Goal: Safe management of artificial airway and ventilation  Outcome: Progressing  Goal: Successful weaning off mechanical ventilator, spontaneously maintains adequate gas exchange  Outcome: Progressing  Goal: Patient will be able to express needs and understand communication  Outcome: Progressing     Problem: Physical Regulation  Goal: Diagnostic test results will improve  Outcome: Progressing  Goal: Signs and symptoms of infection will decrease  Outcome: Progressing       Patient is not progressing towards the following goals:

## 2024-07-26 NOTE — THERAPY
Occupational Therapy Contact Note    Patient Name: Babar Yuan  Age:  74 y.o., Sex:  male  Medical Record #: [id number]  Today's Date: 7/26/2024 07/26/24 1130   Interdisciplinary Plan of Care Collaboration   Collaboration Comments OT orders received, spoke to pt and spouse face to face. Pt is up self in room and performing BADLs w/ no assist. Eval not indicated at this time. Please re-order should functional status change

## 2024-07-26 NOTE — PROGRESS NOTES
"Hospital Medicine Daily Progress Note    Date of Service  7/26/2024    Chief Complaint  Babar Yuan is a 74 y.o. male admitted 7/24/2024 with Abdominal Pain ([facility] with abd pain, CT revealed bowel obstruction. NG tube placed, pt given 0.5 mg dilaudid, 2L IVF, and \"unknown antibiotics\" x 2 PTA. )        Hospital Course  No notes on file    Interval Problem Update  Patient seen and examine at bedside  I reviewed the chart along with vitals, labs, imaging, test (both pending and resulted) and recommendations from specialists and interdisciplinary team.  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 He first presented with nausea, vomiting and abdominal pain. [facility] leukocytosis, BUN 70, Cr- 1.6, lactic acid 2.5 Procalcitonin 0.07. CTA showed arkedly dilated distal bowel loops including dilatation of the iliac pouch increased from prior, with transition occurring at the distal pouch anastomotic suture, complex 1 cm left upper pole renal lesion suspicious for neoplasm such as a renal cell carcinoma. Patient given antibiotics. Surgery consulted awaiting CT images to load meanwhile patient made NPO for potential procedure.   Managing SBO, complicated surgery.   Nursing noted that patient was wanting surgical plan and was threatening to leave AMA. He had some output from his stoma. They mentioned that there has been normal output now. I spoke with patient and wife and deescalated the situation. Despite normal output, we do not know the degree of obstruction and need for decompression. We reviewed the CT findings. I called surgeon. The team came at bedside and had now informed us that they had consulted the GI team regarding possible dilation. " Meanwhile the surgical team reviewed the CT scan and explained to wife and patient regarding the degree of obstruction at the anastomotic site. Meanwhile, patient wanted to eat. I spoke to them that we will need to wait for GI as possible procedure may be done. I spoke with Dr. Tapia. On the scan the obstruction is actually quite iong that unlikely dilation may be done however given that there is still a lot of fluid collection in the intestines will still need an enteroscopy through the stoma for decompression. They will try their best to do it today. They recommend against advancing diet as this will worsen the obstruction. GI came at bedside and reviewed the enteroscopic findings. Patient's and wifes questions were answered to satisfaction.     I have discussed this patient's plan of care and discharge plan at IDT rounds today with Case Management, Nursing, Nursing leadership, and other members of the IDT team.    Consultants/Specialty  general surgery and GI    Code Status  DNAR/DNI    Disposition  <MEDICALLYCLEARED>  I have placed the appropriate orders for post-discharge needs.    Review of Systems  Review of Systems   Constitutional:  Negative for chills and fever.   HENT:  Negative for congestion, hearing loss and nosebleeds.    Eyes:  Negative for pain and redness.   Respiratory:  Negative for cough, hemoptysis, shortness of breath and wheezing.    Cardiovascular:  Negative for chest pain and palpitations.   Gastrointestinal:  Positive for abdominal pain, nausea and vomiting. Negative for blood in stool, constipation and diarrhea.   Genitourinary:  Negative for dysuria, frequency and hematuria.   Musculoskeletal:  Negative for falls, joint pain and myalgias.   Skin:  Negative for rash.   Neurological:  Negative for dizziness, tremors, focal weakness, seizures, loss of consciousness, weakness and headaches.   Psychiatric/Behavioral:  The patient is not nervous/anxious and does not have insomnia.    All other  systems reviewed and are negative.       Physical Exam  Temp:  [36.3 °C (97.3 °F)-36.7 °C (98.1 °F)] 36.3 °C (97.3 °F)  Pulse:  [50-57] 50  Resp:  [15-18] 17  BP: (113-125)/(59-67) 113/59  SpO2:  [92 %-98 %] 96 %    Physical Exam  Vitals and nursing note reviewed.   HENT:      Head: Normocephalic and atraumatic.      Right Ear: External ear normal.      Left Ear: External ear normal.      Nose: Nose normal.      Mouth/Throat:      Mouth: Mucous membranes are moist.   Eyes:      General: No scleral icterus.     Conjunctiva/sclera: Conjunctivae normal.   Cardiovascular:      Rate and Rhythm: Normal rate and regular rhythm.      Heart sounds: No murmur heard.     No friction rub. No gallop.   Pulmonary:      Effort: Pulmonary effort is normal.      Breath sounds: Normal breath sounds.   Abdominal:      General: Abdomen is flat. Bowel sounds are normal. There is distension (mild but soft).      Palpations: Abdomen is soft.      Tenderness: There is no abdominal tenderness. There is no guarding.   Musculoskeletal:         General: Normal range of motion.      Cervical back: Normal range of motion and neck supple.   Skin:     General: Skin is warm.   Neurological:      Mental Status: He is alert and oriented to person, place, and time. Mental status is at baseline.   Psychiatric:         Mood and Affect: Mood normal.         Behavior: Behavior normal.         Thought Content: Thought content normal.         Judgment: Judgment normal.      Comments: Amxious         Fluids    Intake/Output Summary (Last 24 hours) at 7/26/2024 1636  Last data filed at 7/26/2024 0029  Gross per 24 hour   Intake --   Output 26 ml   Net -26 ml       Laboratory  Recent Labs     07/25/24  0113 07/26/24  0431   WBC 11.4* 9.4   RBC 4.77 4.58*   HEMOGLOBIN 13.9* 13.3*   HEMATOCRIT 40.9* 39.9*   MCV 85.7 87.1   MCH 29.1 29.0   MCHC 34.0 33.3   RDW 43.8 45.8   PLATELETCT 384 328   MPV 9.1 9.0     Recent Labs     07/25/24  0113 07/26/24  0431   SODIUM  136 137   POTASSIUM 4.2 3.6   CHLORIDE 102 104   CO2 20 23   GLUCOSE 143* 95   BUN 16 18   CREATININE 1.34 1.28   CALCIUM 8.2* 8.3*     Recent Labs     07/25/24  0204   INR 1.20*               Imaging  DX-ABDOMEN FOR TUBE PLACEMENT   Final Result         1.  Left infrahilar infiltrates   2.  Atherosclerosis   3.  Air-filled distention of bowel in the upper abdomen, consider ileus and/or enteritis versus evolving obstructive changes.      OUTSIDE IMAGES-CT ABDOMEN /PELVIS   Final Result      OUTSIDE IMAGES-DX CHEST   Final Result           Assessment/Plan  * SBO (small bowel obstruction) (HCC)- (present on admission)  Assessment & Plan  [facility]  Pt had BCIR 30years ago for IBD/colitis    NPO, bowel rest  IV Protonix  Supportive pain control  NG decompression  Surgery consulted, GI consulted enteroscopy through stoma recommended tonight    Renal lesion  Assessment & Plan  Left sided 1cm renal lesion seen on CT AP  Discussed with patient, he is already aware of this. I advised to follow up with PCP outpatient, he expressed understanding  Follow up with Urology for this.    ACP (advance care planning)  Assessment & Plan  Goal care discussed with patient in ER.  He stated he does not wish to be kept alive on a machine in a vegetative state.  He also clarified he has advanced directive POLST at home.  Patient is agreeable for no invasive or heroic life-sustaining measures-no CPR/defibrillation/intubation mechanical ventilation.  He is however agreeable for [place] invasive medical management, including IVF, IV analgesia, IV antibiotic as clinically warranted, as well as evaluation by surgery service.  Diagnosis, prognosis, question and concern addressed.  DNR/denies status confirmed.  ACP: 17 minutes    Sepsis (HCC)  Assessment & Plan  This is Sepsis Present on admission  SIRS criteria identified on my evaluation include: Tachycardia, with heart rate greater than 90 BPM, Leukocytosis, with WBC greater  than 12,000, and Bandemia, greater than 10% bands  Clinical indicators of end organ dysfunction include Lactic Acid greater than 2  Source is GI  Sepsis protocol initiated  Crystalloid Fluid Administration: Fluid resuscitation ordered per standard protocol - 30 mL/kg per current or ideal body weight  IV antibiotics as appropriate for source of sepsis  Reassessment: I have reassessed the patient's hemodynamic status    WBC 16.48k, LA 2.5  IVF  Abx: ceftriaxone, flagyl  Follow cultures    TRISTAN (acute kidney injury) (HCC)  Assessment & Plan  Creatinine 1.6, unknown baseline  IVF  Minimize nephrotoxic agents, renally dose meds  Check FeNa  Cr- now 1.3 improved    IBD (inflammatory bowel disease)  Assessment & Plan  Resume home medications once able to verify       VTE prophylaxis: SCDs    I have performed a physical exam and reviewed and updated ROS and Plan today (7/26/2024). In review of yesterday's note (7/25/2024), there are no changes except as documented above.    Patient is has a high medical complexity, complex decision making and is at high risk for complication, morbidity, and mortality, thus requiring the highest level of my preparedness for sudden, emergent intervention. Medical decision making is therefore complex. I provided  services, which included ordering labs and/or imaging, and discussing the case with various consultants.medication orders, frequent reevaluations of the patient's condition and response to treatment. Time was also devoted to counseling and coordinating care including review of records, pertinent lab data and studies, as well as discussing diagnostic evaluation and work up, planned therapeutic interventions and future disposition of care. Where indicated, the assessment and plan reflect discussion of patient with consultants, other healthcare providers, family members, and additional research needed to obtain further information in formulating the plan of care for Babar Yuan.  Total time spent was 70 minutes.

## 2024-07-26 NOTE — THERAPY
Physical Therapy Contact Note    Patient Name: Babar Yuan  Age:  74 y.o., Sex:  male  Medical Record #: [id number]  Today's Date: 7/26/2024    PT consult received and chart reviewed. Pt admitted with SBO. Per discussion with RN, pt is mobilizing independently with no indication of decline in functional status. PT eval not indicated at this time. Please re-consult if change in function.

## 2024-07-26 NOTE — PROGRESS NOTES
Update,     GI postponed the procedure to tmr. Okay to have clear liquid, midnight NPO.   Based on the clinical progression, we would decide if the patient still need scope on 7/27 (it seems his enterostomy/opening of reservior is patent again).

## 2024-07-26 NOTE — CONSULTS
Date of Consultation:  7/26/2024    Patient: : Babar Yuan  MRN: [id number]    Referring Physician:  Dr. Kehinde Jaime MD.      GI:Marlene Tapia M.D.     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    Had a recent upper GI and lower GI scope in early July this year at the primary GI group in [place].  Ulcer was noted at the reservior outlet but do not no evidence of obstruction.    The patient currently traveling to [facility].  Bowel obstruction was noted with nausea and vomiting and also decreased output from the ilial reservoir.  [facility]; per the patient, had no GI consultation or surgery support, came to [facility].    GI team saw the patient at bedside.  Finally the patient can drain some bowel from the reservior by himself today.  Continuing to feel thirsty, distended abdomen and the pressure like discomfort.    No evidence of acute abdomen.  [facility] ago was reviewed by GI team with the family.              No past medical history on file.      No past surgical history on file.    No family history on file.    Social History     Socioeconomic History    Marital status:            Physical Exam:  Vitals:    07/25/24 1823 07/25/24 1904 07/26/24 0351 07/26/24 0702   BP:  124/60 115/60 113/59   Pulse:  (!) 57 (!) 50 (!) 50   Resp: 16 16 18 17   Temp:  36.7 °C (98.1 °F) 36.6 °C (97.9 °F) 36.3 °C (97.3 °F)   TempSrc:  Temporal Temporal Temporal   SpO2:  92% 98% 96%   Weight:       Height:           Constitutional: No fevers.  Eyes: No symptoms reported.   Ears/Nose/Throat/Mouth: No choking reported.  Cardiovascular: No chest pain reported.   Respiratory: Denies shortness  of breath.  Gastrointestinal/Hepatic: Per H/P.   Skin/Breast: No new rash reported.   Psychiatric: No complaints    HEENT: grossly normal.  Cardiovascular: Please refer to primary team's daily assessment.   Lungs: Please refer to primary team's daily assessment.   Abdomen: Deferred to primary team; the primary team was examining the patient home with visit.  Skin: No erythema, No rash.  Lower limbs: normal, no pitting edema.   Neurologic: Alert & oriented x 3, Normal motor function, No focal deficits noted.  PSY: stable mood.             Labs:  Recent Labs     07/25/24  0113 07/26/24  0431   WBC 11.4* 9.4   RBC 4.77 4.58*   HEMOGLOBIN 13.9* 13.3*   HEMATOCRIT 40.9* 39.9*   MCV 85.7 87.1   MCH 29.1 29.0   MCHC 34.0 33.3   RDW 43.8 45.8   PLATELETCT 384 328   MPV 9.1 9.0     Recent Labs     07/25/24  0113 07/26/24  0431   SODIUM 136 137   POTASSIUM 4.2 3.6   CHLORIDE 102 104   CO2 20 23   GLUCOSE 143* 95   BUN 16 18     Recent Labs     07/25/24  0204   INR 1.20*       Recent Labs     07/25/24  0113 07/25/24  0204   ASTSGOT 14  --    ALTSGPT 17  --    TBILIRUBIN 0.5  --    ALKPHOSPHAT 106*  --    GLOBULIN 3.3  --    INR  --  1.20*         Imaging:  DX-ABDOMEN FOR TUBE PLACEMENT  Narrative:  7/25/2024 8:48 PM    HISTORY/REASON FOR EXAM: Nasogastric tube placement    TECHNIQUE/EXAM DESCRIPTION: Single AP view of the abdomen    COMPARISON:  None    FINDINGS:    Left infrahilar opacity is seen. Atherosclerotic changes are seen.    Air-filled distention of bowel in the upper abdomen is seen.    The bony structures appear age-appropriate.  Impression: 1.  Left infrahilar infiltrates  2.  Atherosclerosis  3.  Air-filled distention of bowel in the upper abdomen, consider ileus and/or enteritis versus evolving obstructive changes.            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    Had a recent upper GI and lower GI scope in early July this year at the primary GI group in [place].  Ulcer was noted at the reservior outlet but do not no evidence of obstruction.    Plan to have endoscopy study through the opening of reservoir and drain the air/bowel to decrease the pressure.  If safe to dilate, GI team will try; otherwise we will try to put a Grimaldo or decompression tube to keep the outlet   open.      Keep NPO, please try to do it today, if not, will be tmr morning.         This note was generated using voice recognition software which has a small chance of producing errors of grammar and possibly content. I have made every reasonable attempt to find and correct any obvious errors, but expect that some may not be found prior to finalization of this note.

## 2024-07-26 NOTE — CARE PLAN
The patient is Watcher - Medium risk of patient condition declining or worsening    Shift Goals  Clinical Goals: safety, pain control  Patient Goals: rest, comfort  Family Goals: no family present    Progress made toward(s) clinical / shift goals:    Problem: Pain - Standard  Goal: Alleviation of pain or a reduction in pain to the patient’s comfort goal  Outcome: Progressing    Administer pain medications per MAR.     Problem: Knowledge Deficit - Standard  Goal: Patient and family/care givers will demonstrate understanding of plan of care, disease process/condition, diagnostic tests and medications  Outcome: Progressing    Educated the patient regarding his plan of care and encourage the patient to actively participate in his care.       Patient is not progressing towards the following goals:

## 2024-07-26 NOTE — PROGRESS NOTES
0015 Patient pulled his NG tube.     0019 Patient refused reinsertion of NG tube. Educated well the importance of NG tube. ICU rapid Alejandro also spoke to the patient the importance of NG tube but still refused. OTIS Mills informed. Charge nurse Shelley interiano.

## 2024-07-26 NOTE — PROGRESS NOTES
"  DATE: 7/26/2024    Hospital Day1 Surgical consult for SBO    INTERVAL EVENTS:  Surgical consultation for small bowel obstruction.  Films reviewed and  General surgery is recommending a GI consult for possible dilation.    Consult placed    No Acute Surgical Plans at this time  ACS bravo will sign off      PHYSICAL EXAMINATION:  Vital Signs: /59   Pulse (!) 50   Temp 36.3 °C (97.3 °F) (Temporal)   Resp 17   Ht 1.651 m (5' 5\")   Wt 60.3 kg (133 lb)   SpO2 96%     Physical Exam  Vitals and nursing note reviewed. Exam conducted with a chaperone present.   Constitutional:       Appearance: Normal appearance.   HENT:      Head: Normocephalic.      Mouth/Throat:      Mouth: Mucous membranes are moist.   Pulmonary:      Effort: Pulmonary effort is normal.   Musculoskeletal:      Cervical back: Normal range of motion.   Skin:     General: Skin is warm.   Neurological:      General: No focal deficit present.      Mental Status: He is alert and oriented to person, place, and time.   Psychiatric:         Mood and Affect: Mood normal.           LABORATORY VALUES:  Recent Labs     07/25/24  0113 07/26/24  0431   WBC 11.4* 9.4   RBC 4.77 4.58*   HEMOGLOBIN 13.9* 13.3*   HEMATOCRIT 40.9* 39.9*   MCV 85.7 87.1   MCH 29.1 29.0   MCHC 34.0 33.3   RDW 43.8 45.8   PLATELETCT 384 328   MPV 9.1 9.0     Recent Labs     07/25/24  0113 07/26/24  0431   SODIUM 136 137   POTASSIUM 4.2 3.6   CHLORIDE 102 104   CO2 20 23   GLUCOSE 143* 95   BUN 16 18   CREATININE 1.34 1.28   CALCIUM 8.2* 8.3*     Recent Labs     07/25/24  0113 07/25/24  0204   ASTSGOT 14  --    ALTSGPT 17  --    TBILIRUBIN 0.5  --    ALKPHOSPHAT 106*  --    GLOBULIN 3.3  --    INR  --  1.20*     Recent Labs     07/25/24  0204   INR 1.20*        IMAGING:  DX-ABDOMEN FOR TUBE PLACEMENT   Final Result         1.  Left infrahilar infiltrates   2.  Atherosclerosis   3.  Air-filled distention of bowel in the upper abdomen, consider ileus and/or enteritis versus evolving " obstructive changes.      OUTSIDE IMAGES-CT ABDOMEN /PELVIS   Final Result      OUTSIDE IMAGES-DX CHEST   Final Result          ASSESSMENT AND PLAN:  * SBO (small bowel obstruction) (HCC)- (present on admission)  Assessment & Plan  Hx of IBD  SP colectomy and has reservoir  7/26 GI consult placed for possible dilation.            ____________________________________     NISHA NazarioPFranciN.    DD: 7/26/2024  11:50 AM

## 2024-07-27 ENCOUNTER — APPOINTMENT (OUTPATIENT)
Dept: CARDIOLOGY | Facility: MEDICAL CENTER | Age: 74
End: 2024-07-27
Attending: INTERNAL MEDICINE
Payer: COMMERCIAL

## 2024-07-27 PROBLEM — I48.91 ATRIAL FIBRILLATION WITH RVR (HCC): Status: ACTIVE | Noted: 2024-07-27

## 2024-07-27 LAB
ALBUMIN SERPL BCP-MCNC: 2.6 G/DL (ref 3.2–4.9)
BUN SERPL-MCNC: 13 MG/DL (ref 8–22)
CALCIUM ALBUM COR SERPL-MCNC: 9.3 MG/DL (ref 8.5–10.5)
CALCIUM SERPL-MCNC: 8.2 MG/DL (ref 8.5–10.5)
CHLORIDE SERPL-SCNC: 103 MMOL/L (ref 96–112)
CO2 SERPL-SCNC: 25 MMOL/L (ref 20–33)
CREAT SERPL-MCNC: 1.34 MG/DL (ref 0.5–1.4)
EKG IMPRESSION: NORMAL
ERYTHROCYTE [DISTWIDTH] IN BLOOD BY AUTOMATED COUNT: 45.6 FL (ref 35.9–50)
GFR SERPLBLD CREATININE-BSD FMLA CKD-EPI: 56 ML/MIN/1.73 M 2
GLUCOSE SERPL-MCNC: 89 MG/DL (ref 65–99)
HCT VFR BLD AUTO: 40.5 % (ref 42–52)
HGB BLD-MCNC: 13.5 G/DL (ref 14–18)
LV EJECT FRACT  99904: 60
MCH RBC QN AUTO: 29.5 PG (ref 27–33)
MCHC RBC AUTO-ENTMCNC: 33.3 G/DL (ref 32.3–36.5)
MCV RBC AUTO: 88.4 FL (ref 81.4–97.8)
PHOSPHATE SERPL-MCNC: 3.1 MG/DL (ref 2.5–4.5)
PLATELET # BLD AUTO: 352 K/UL (ref 164–446)
PMV BLD AUTO: 8.9 FL (ref 9–12.9)
POTASSIUM SERPL-SCNC: 3.8 MMOL/L (ref 3.6–5.5)
RBC # BLD AUTO: 4.58 M/UL (ref 4.7–6.1)
SODIUM SERPL-SCNC: 139 MMOL/L (ref 135–145)
TSH SERPL DL<=0.005 MIU/L-ACNC: 1.49 UIU/ML (ref 0.38–5.33)
WBC # BLD AUTO: 8.7 K/UL (ref 4.8–10.8)

## 2024-07-27 PROCEDURE — 80069 RENAL FUNCTION PANEL: CPT

## 2024-07-27 PROCEDURE — A9270 NON-COVERED ITEM OR SERVICE: HCPCS | Performed by: INTERNAL MEDICINE

## 2024-07-27 PROCEDURE — 0D7B8ZZ DILATION OF ILEUM, VIA NATURAL OR ARTIFICIAL OPENING ENDOSCOPIC: ICD-10-PCS | Performed by: INTERNAL MEDICINE

## 2024-07-27 PROCEDURE — 160202 HCHG ENDO MINUTES - 1ST 30 MINS LEVEL 3: Performed by: INTERNAL MEDICINE

## 2024-07-27 PROCEDURE — 93005 ELECTROCARDIOGRAM TRACING: CPT | Performed by: INTERNAL MEDICINE

## 2024-07-27 PROCEDURE — 700111 HCHG RX REV CODE 636 W/ 250 OVERRIDE (IP): Performed by: STUDENT IN AN ORGANIZED HEALTH CARE EDUCATION/TRAINING PROGRAM

## 2024-07-27 PROCEDURE — A9270 NON-COVERED ITEM OR SERVICE: HCPCS | Performed by: STUDENT IN AN ORGANIZED HEALTH CARE EDUCATION/TRAINING PROGRAM

## 2024-07-27 PROCEDURE — 99418 PROLNG IP/OBS E/M EA 15 MIN: CPT | Performed by: INTERNAL MEDICINE

## 2024-07-27 PROCEDURE — C1769 GUIDE WIRE: HCPCS | Performed by: INTERNAL MEDICINE

## 2024-07-27 PROCEDURE — 700105 HCHG RX REV CODE 258: Performed by: STUDENT IN AN ORGANIZED HEALTH CARE EDUCATION/TRAINING PROGRAM

## 2024-07-27 PROCEDURE — 93306 TTE W/DOPPLER COMPLETE: CPT

## 2024-07-27 PROCEDURE — 700102 HCHG RX REV CODE 250 W/ 637 OVERRIDE(OP): Performed by: INTERNAL MEDICINE

## 2024-07-27 PROCEDURE — 700102 HCHG RX REV CODE 250 W/ 637 OVERRIDE(OP): Performed by: STUDENT IN AN ORGANIZED HEALTH CARE EDUCATION/TRAINING PROGRAM

## 2024-07-27 PROCEDURE — 700101 HCHG RX REV CODE 250: Performed by: ANESTHESIOLOGY

## 2024-07-27 PROCEDURE — 700111 HCHG RX REV CODE 636 W/ 250 OVERRIDE (IP): Performed by: ANESTHESIOLOGY

## 2024-07-27 PROCEDURE — 93306 TTE W/DOPPLER COMPLETE: CPT | Mod: 26 | Performed by: INTERNAL MEDICINE

## 2024-07-27 PROCEDURE — 700101 HCHG RX REV CODE 250: Performed by: STUDENT IN AN ORGANIZED HEALTH CARE EDUCATION/TRAINING PROGRAM

## 2024-07-27 PROCEDURE — 84443 ASSAY THYROID STIM HORMONE: CPT

## 2024-07-27 PROCEDURE — 36415 COLL VENOUS BLD VENIPUNCTURE: CPT

## 2024-07-27 PROCEDURE — 160009 HCHG ANES TIME/MIN: Performed by: INTERNAL MEDICINE

## 2024-07-27 PROCEDURE — 45337 SIGMOIDOSCOPY & DECOMPRESS: CPT | Performed by: INTERNAL MEDICINE

## 2024-07-27 PROCEDURE — 160048 HCHG OR STATISTICAL LEVEL 1-5: Performed by: INTERNAL MEDICINE

## 2024-07-27 PROCEDURE — 700102 HCHG RX REV CODE 250 W/ 637 OVERRIDE(OP)

## 2024-07-27 PROCEDURE — A9270 NON-COVERED ITEM OR SERVICE: HCPCS

## 2024-07-27 PROCEDURE — 93010 ELECTROCARDIOGRAM REPORT: CPT | Performed by: STUDENT IN AN ORGANIZED HEALTH CARE EDUCATION/TRAINING PROGRAM

## 2024-07-27 PROCEDURE — 770001 HCHG ROOM/CARE - MED/SURG/GYN PRIV*

## 2024-07-27 PROCEDURE — 99233 SBSQ HOSP IP/OBS HIGH 50: CPT | Mod: 25 | Performed by: INTERNAL MEDICINE

## 2024-07-27 PROCEDURE — 93005 ELECTROCARDIOGRAM TRACING: CPT | Performed by: ANESTHESIOLOGY

## 2024-07-27 PROCEDURE — 160002 HCHG RECOVERY MINUTES (STAT): Performed by: INTERNAL MEDICINE

## 2024-07-27 PROCEDURE — 85027 COMPLETE CBC AUTOMATED: CPT

## 2024-07-27 PROCEDURE — 160035 HCHG PACU - 1ST 60 MINS PHASE I: Performed by: INTERNAL MEDICINE

## 2024-07-27 RX ORDER — METOPROLOL TARTRATE 25 MG/1
12.5 TABLET, FILM COATED ORAL 2 TIMES DAILY
Status: DISCONTINUED | OUTPATIENT
Start: 2024-07-27 | End: 2024-07-29

## 2024-07-27 RX ORDER — LIDOCAINE HYDROCHLORIDE 20 MG/ML
INJECTION, SOLUTION EPIDURAL; INFILTRATION; INTRACAUDAL; PERINEURAL PRN
Status: DISCONTINUED | OUTPATIENT
Start: 2024-07-27 | End: 2024-07-27 | Stop reason: SURG

## 2024-07-27 RX ORDER — ROCURONIUM BROMIDE 10 MG/ML
INJECTION, SOLUTION INTRAVENOUS PRN
Status: DISCONTINUED | OUTPATIENT
Start: 2024-07-27 | End: 2024-07-27 | Stop reason: SURG

## 2024-07-27 RX ORDER — PHENYLEPHRINE HCL IN 0.9% NACL 1 MG/10 ML
SYRINGE (ML) INTRAVENOUS PRN
Status: DISCONTINUED | OUTPATIENT
Start: 2024-07-27 | End: 2024-07-27 | Stop reason: SURG

## 2024-07-27 RX ORDER — HYDROMORPHONE HYDROCHLORIDE 1 MG/ML
0.4 INJECTION, SOLUTION INTRAMUSCULAR; INTRAVENOUS; SUBCUTANEOUS
Status: DISCONTINUED | OUTPATIENT
Start: 2024-07-27 | End: 2024-07-27 | Stop reason: HOSPADM

## 2024-07-27 RX ORDER — MEPERIDINE HYDROCHLORIDE 25 MG/ML
12.5 INJECTION INTRAMUSCULAR; INTRAVENOUS; SUBCUTANEOUS
Status: DISCONTINUED | OUTPATIENT
Start: 2024-07-27 | End: 2024-07-27 | Stop reason: HOSPADM

## 2024-07-27 RX ORDER — METRONIDAZOLE 500 MG/1
500 TABLET ORAL EVERY 12 HOURS
Status: DISCONTINUED | OUTPATIENT
Start: 2024-07-27 | End: 2024-07-27

## 2024-07-27 RX ORDER — LIDOCAINE HYDROCHLORIDE 40 MG/ML
SOLUTION TOPICAL PRN
Status: DISCONTINUED | OUTPATIENT
Start: 2024-07-27 | End: 2024-07-27 | Stop reason: SURG

## 2024-07-27 RX ORDER — SODIUM CHLORIDE, SODIUM GLUCONATE, SODIUM ACETATE, POTASSIUM CHLORIDE AND MAGNESIUM CHLORIDE 526; 502; 368; 37; 30 MG/100ML; MG/100ML; MG/100ML; MG/100ML; MG/100ML
500 INJECTION, SOLUTION INTRAVENOUS CONTINUOUS
Status: DISCONTINUED | OUTPATIENT
Start: 2024-07-27 | End: 2024-07-27 | Stop reason: HOSPADM

## 2024-07-27 RX ORDER — OXYCODONE HCL 5 MG/5 ML
5 SOLUTION, ORAL ORAL
Status: DISCONTINUED | OUTPATIENT
Start: 2024-07-27 | End: 2024-07-27 | Stop reason: HOSPADM

## 2024-07-27 RX ORDER — ONDANSETRON 2 MG/ML
4 INJECTION INTRAMUSCULAR; INTRAVENOUS
Status: DISCONTINUED | OUTPATIENT
Start: 2024-07-27 | End: 2024-07-27 | Stop reason: HOSPADM

## 2024-07-27 RX ORDER — HYDROMORPHONE HYDROCHLORIDE 1 MG/ML
1 INJECTION, SOLUTION INTRAMUSCULAR; INTRAVENOUS; SUBCUTANEOUS
Status: DISCONTINUED | OUTPATIENT
Start: 2024-07-27 | End: 2024-07-27 | Stop reason: HOSPADM

## 2024-07-27 RX ORDER — EPHEDRINE SULFATE 50 MG/ML
INJECTION, SOLUTION INTRAVENOUS PRN
Status: DISCONTINUED | OUTPATIENT
Start: 2024-07-27 | End: 2024-07-27 | Stop reason: SURG

## 2024-07-27 RX ORDER — CALCIUM CHLORIDE 100 MG/ML
INJECTION INTRAVENOUS; INTRAVENTRICULAR PRN
Status: DISCONTINUED | OUTPATIENT
Start: 2024-07-27 | End: 2024-07-27 | Stop reason: SURG

## 2024-07-27 RX ORDER — OXYCODONE HCL 5 MG/5 ML
10 SOLUTION, ORAL ORAL
Status: DISCONTINUED | OUTPATIENT
Start: 2024-07-27 | End: 2024-07-27 | Stop reason: HOSPADM

## 2024-07-27 RX ORDER — IPRATROPIUM BROMIDE AND ALBUTEROL SULFATE 2.5; .5 MG/3ML; MG/3ML
3 SOLUTION RESPIRATORY (INHALATION)
Status: DISCONTINUED | OUTPATIENT
Start: 2024-07-27 | End: 2024-07-27 | Stop reason: HOSPADM

## 2024-07-27 RX ORDER — DIPHENHYDRAMINE HYDROCHLORIDE 50 MG/ML
12.5 INJECTION INTRAMUSCULAR; INTRAVENOUS
Status: DISCONTINUED | OUTPATIENT
Start: 2024-07-27 | End: 2024-07-27 | Stop reason: HOSPADM

## 2024-07-27 RX ORDER — METRONIDAZOLE 500 MG/1
500 TABLET ORAL EVERY 12 HOURS
Status: DISCONTINUED | OUTPATIENT
Start: 2024-07-27 | End: 2024-07-29 | Stop reason: HOSPADM

## 2024-07-27 RX ORDER — HYDROMORPHONE HYDROCHLORIDE 1 MG/ML
0.2 INJECTION, SOLUTION INTRAMUSCULAR; INTRAVENOUS; SUBCUTANEOUS
Status: DISCONTINUED | OUTPATIENT
Start: 2024-07-27 | End: 2024-07-27 | Stop reason: HOSPADM

## 2024-07-27 RX ORDER — MIDAZOLAM HYDROCHLORIDE 1 MG/ML
1 INJECTION INTRAMUSCULAR; INTRAVENOUS
Status: DISCONTINUED | OUTPATIENT
Start: 2024-07-27 | End: 2024-07-27 | Stop reason: HOSPADM

## 2024-07-27 RX ORDER — HALOPERIDOL 5 MG/ML
1 INJECTION INTRAMUSCULAR
Status: DISCONTINUED | OUTPATIENT
Start: 2024-07-27 | End: 2024-07-27 | Stop reason: HOSPADM

## 2024-07-27 RX ADMIN — METOPROLOL TARTRATE 12.5 MG: 25 TABLET, FILM COATED ORAL at 16:39

## 2024-07-27 RX ADMIN — OMEPRAZOLE 20 MG: 20 CAPSULE, DELAYED RELEASE ORAL at 16:39

## 2024-07-27 RX ADMIN — SODIUM CHLORIDE, POTASSIUM CHLORIDE, SODIUM LACTATE AND CALCIUM CHLORIDE: 600; 310; 30; 20 INJECTION, SOLUTION INTRAVENOUS at 23:21

## 2024-07-27 RX ADMIN — Medication 200 MCG: at 10:29

## 2024-07-27 RX ADMIN — EPHEDRINE SULFATE 15 MG: 50 INJECTION, SOLUTION INTRAVENOUS at 10:25

## 2024-07-27 RX ADMIN — CEFTRIAXONE SODIUM 2000 MG: 10 INJECTION, POWDER, FOR SOLUTION INTRAVENOUS at 21:16

## 2024-07-27 RX ADMIN — PANTOPRAZOLE SODIUM 40 MG: 40 INJECTION, POWDER, FOR SOLUTION INTRAVENOUS at 05:29

## 2024-07-27 RX ADMIN — LIDOCAINE HYDROCHLORIDE 4 ML: 40 SOLUTION TOPICAL at 10:19

## 2024-07-27 RX ADMIN — CALCIUM CHLORIDE 1 G: 100 INJECTION, SOLUTION INTRAVENOUS; INTRAVENTRICULAR at 10:21

## 2024-07-27 RX ADMIN — OXYCODONE HYDROCHLORIDE 10 MG: 10 TABLET ORAL at 23:22

## 2024-07-27 RX ADMIN — APIXABAN 5 MG: 5 TABLET, FILM COATED ORAL at 16:39

## 2024-07-27 RX ADMIN — SODIUM CHLORIDE, POTASSIUM CHLORIDE, SODIUM LACTATE AND CALCIUM CHLORIDE: 600; 310; 30; 20 INJECTION, SOLUTION INTRAVENOUS at 05:00

## 2024-07-27 RX ADMIN — LIDOCAINE HYDROCHLORIDE 40 MG: 20 INJECTION, SOLUTION EPIDURAL; INFILTRATION; INTRACAUDAL at 10:19

## 2024-07-27 RX ADMIN — Medication 200 MCG: at 10:23

## 2024-07-27 RX ADMIN — OXYCODONE HYDROCHLORIDE 10 MG: 10 TABLET ORAL at 08:39

## 2024-07-27 RX ADMIN — METRONIDAZOLE 500 MG: 500 TABLET ORAL at 16:39

## 2024-07-27 RX ADMIN — SENNOSIDES AND DOCUSATE SODIUM 2 TABLET: 50; 8.6 TABLET ORAL at 17:03

## 2024-07-27 RX ADMIN — TRAZODONE HYDROCHLORIDE 200 MG: 50 TABLET ORAL at 21:17

## 2024-07-27 RX ADMIN — PROPOFOL 100 MG: 10 INJECTION, EMULSION INTRAVENOUS at 10:19

## 2024-07-27 RX ADMIN — ROCURONIUM BROMIDE 80 MG: 50 INJECTION, SOLUTION INTRAVENOUS at 10:19

## 2024-07-27 RX ADMIN — METRONIDAZOLE 500 MG: 500 INJECTION, SOLUTION INTRAVENOUS at 08:41

## 2024-07-27 RX ADMIN — THERA TABS 1 TABLET: TAB at 05:29

## 2024-07-27 ASSESSMENT — ENCOUNTER SYMPTOMS
FALLS: 0
WHEEZING: 0
MYALGIAS: 0
FEVER: 0
EYE REDNESS: 0
NAUSEA: 1
HEADACHES: 0
CONSTIPATION: 0
CHILLS: 0
HEMOPTYSIS: 0
FOCAL WEAKNESS: 0
EYE PAIN: 0
ABDOMINAL PAIN: 1
INSOMNIA: 0
WEAKNESS: 0
SHORTNESS OF BREATH: 0
COUGH: 0
SEIZURES: 0
NERVOUS/ANXIOUS: 0
VOMITING: 1
DIZZINESS: 0
TREMORS: 0
DIARRHEA: 0
LOSS OF CONSCIOUSNESS: 0
BLOOD IN STOOL: 0
PALPITATIONS: 0

## 2024-07-27 ASSESSMENT — PAIN SCALES - GENERAL: PAIN_LEVEL: 0

## 2024-07-27 ASSESSMENT — FIBROSIS 4 INDEX
FIB4 SCORE: 0.71
FIB4 SCORE: 0.71

## 2024-07-27 ASSESSMENT — PAIN DESCRIPTION - PAIN TYPE
TYPE: ACUTE PAIN
TYPE: ACUTE PAIN

## 2024-07-27 NOTE — OR NURSING
1039: To PACU from OR via bed, sleeping, respirations spontaneous and non-labored. Noted to be in A Fib of unknown onset time. Plan 12 lead EKG. Abdo soft, + bowel sounds, valdez to ostomy (clamped at this time).     1047: Rouses to name, denies pain, states no Hx of A Fib    1050: s/b Dr Tapia who states to leave valdez to ostomy clamped at this time     1055: 12 Lead EKG in progress    1100: Dr Valles updated 12 lead EKG shows A Fib. SpO2 88-89 on RA, O2 resumed via n/c    1110: Dr Tapia @ bedside, informed of new onset A fib - plans to have hospitalist address.    1121: Cleared for transfer back to floor by Dr Valles who also states Medical team to address A Fib

## 2024-07-27 NOTE — PROGRESS NOTES
Patient had 1 episode of emesis while in the bathroom at 2140.  Greenish colored gastric fluids noted in emesis upon observation of RN.  Medicated as ordered.  Repositioned for comfort.

## 2024-07-27 NOTE — PROGRESS NOTES
Patient observed ambulating in the hallway accompanied by his wife, appears A&O x 4, not in respiratory distress, steady gait noted during ambulation.  Denied nausea symptoms at time of report.  To monitor for changes in abdominal symptoms.  Possible gastroscopy in AM per orders.  To keep patient on NPO by MN.  Patient and spouse informed of plans.

## 2024-07-27 NOTE — PROGRESS NOTES
Pt A&O x4. Plan of care discussed, all concerns addressed. Call light and belongings within reach. Pt educated on fall precautions with verbalized understanding. Bed locked and in lowest position.

## 2024-07-27 NOTE — PROCEDURES
OPERATIVE REPORT        PATIENT: Babar Yuan  1950      PREOPERATIVE DIAGNOSIS/INDICATION: Obstruction of the reservoir.     POSTOPERATIVE DIAGNOSES: s/p drain, decompression, and valdez placement.     PROCEDURE: Enteroscopy     PHYSICIAN: Reece Tapia MD MPH.     CONSENT:  OBTAINED. The risks, benefits and alternatives of the procedure were discussed in details. The risks include and are not limited to bleeding, infection, perforation, missed lesions, and sedations risks (cardiopulmonary compromise and allergic reaction to medications).    ANESTHESIA:  Per anesthesiologist.    LOCATION: [facility]    DESCRIPTION:  The patient presented to the procedure room.  After routine checkup was performed, patient was brought into endoscopy suite.  Patient was placed on supine position.  Patient was sedated by anesthesia. Vital signs were monitored throughout procedure.  Oxygenation support was provided throughout procedure. Digital rectal examination was performed.  Then, EGD was inserted into patient's reservoir opening.        Scope passed through the artificial sphincter without difficulty. The sphincter is around 6-7cm long. Erythematous inside, but no definite narrowing. No target to dilate/intervene.   In the reservoir,   large amount of liquid vaughn in the reservoir, about 1.2L of liquid/stool was removed.   Multiple clean based ulcers 5-8mm close to the artificial sphincter.   Two clean base ulcers around 3mm at the sphincter, with surrounded with erythematous change.     One 30fr, 30cc balloon type valdez place into the reservoir with guidewire assistance.     Overall stable structure of he reservoir. These ulcer should heal once no more obstruction is noted.     The patient should keep the valdez for now given he is travelling.   GI signs off.     This note has been transcribed with digital voice recognition software and although it has been reviewed may contain grammatical or word errors

## 2024-07-27 NOTE — CARE PLAN
The patient is Stable - Low risk of patient condition declining or worsening    Shift Goals  Clinical Goals: pain control, nausea control, safety  Patient Goals: pain and nausea control, comfort, rest  Family Goals: pain and nausea control    Progress made toward(s) clinical / shift goals:      Patient is not progressing towards the following goals:      Problem: Pain - Standard  Goal: Alleviation of pain or a reduction in pain to the patient’s comfort goal  Outcome: Progressing     Problem: Knowledge Deficit - Standard  Goal: Patient and family/care givers will demonstrate understanding of plan of care, disease process/condition, diagnostic tests and medications  Outcome: Progressing     Problem: Fluid Volume  Goal: Fluid volume balance will be maintained  Outcome: Progressing     Problem: Hemodynamics  Goal: Patient's hemodynamics, fluid balance and neurologic status will be stable or improve  Outcome: Progressing     Problem: Urinary - Renal Perfusion  Goal: Ability to achieve and maintain adequate renal perfusion and functioning will improve  Outcome: Progressing     Problem: Physical Regulation  Goal: Signs and symptoms of infection will decrease  Outcome: Progressing     Patient has been able to ambulate at long distances along the hallway during waking hours.  Non-pharmacologic pain interventions applied, repositioned, pillows offered for comfort, blankets provided, promoted rest periods. Medicated as needed.  Patient denies having problems urinating.  Kept on NPO p MN for possible procedure in am.

## 2024-07-28 LAB
ALBUMIN SERPL BCP-MCNC: 2.6 G/DL (ref 3.2–4.9)
BUN SERPL-MCNC: 11 MG/DL (ref 8–22)
CALCIUM ALBUM COR SERPL-MCNC: 9.4 MG/DL (ref 8.5–10.5)
CALCIUM SERPL-MCNC: 8.3 MG/DL (ref 8.5–10.5)
CHLORIDE SERPL-SCNC: 103 MMOL/L (ref 96–112)
CO2 SERPL-SCNC: 22 MMOL/L (ref 20–33)
CREAT SERPL-MCNC: 1.06 MG/DL (ref 0.5–1.4)
EKG IMPRESSION: NORMAL
GFR SERPLBLD CREATININE-BSD FMLA CKD-EPI: 74 ML/MIN/1.73 M 2
GLUCOSE SERPL-MCNC: 118 MG/DL (ref 65–99)
PHOSPHATE SERPL-MCNC: 3 MG/DL (ref 2.5–4.5)
POTASSIUM SERPL-SCNC: 3.8 MMOL/L (ref 3.6–5.5)
SODIUM SERPL-SCNC: 136 MMOL/L (ref 135–145)

## 2024-07-28 PROCEDURE — 700102 HCHG RX REV CODE 250 W/ 637 OVERRIDE(OP): Performed by: INTERNAL MEDICINE

## 2024-07-28 PROCEDURE — 700102 HCHG RX REV CODE 250 W/ 637 OVERRIDE(OP)

## 2024-07-28 PROCEDURE — 700111 HCHG RX REV CODE 636 W/ 250 OVERRIDE (IP): Performed by: STUDENT IN AN ORGANIZED HEALTH CARE EDUCATION/TRAINING PROGRAM

## 2024-07-28 PROCEDURE — 770001 HCHG ROOM/CARE - MED/SURG/GYN PRIV*

## 2024-07-28 PROCEDURE — 80069 RENAL FUNCTION PANEL: CPT

## 2024-07-28 PROCEDURE — A9270 NON-COVERED ITEM OR SERVICE: HCPCS | Performed by: INTERNAL MEDICINE

## 2024-07-28 PROCEDURE — A9270 NON-COVERED ITEM OR SERVICE: HCPCS | Performed by: STUDENT IN AN ORGANIZED HEALTH CARE EDUCATION/TRAINING PROGRAM

## 2024-07-28 PROCEDURE — 99418 PROLNG IP/OBS E/M EA 15 MIN: CPT | Performed by: INTERNAL MEDICINE

## 2024-07-28 PROCEDURE — 700102 HCHG RX REV CODE 250 W/ 637 OVERRIDE(OP): Performed by: STUDENT IN AN ORGANIZED HEALTH CARE EDUCATION/TRAINING PROGRAM

## 2024-07-28 PROCEDURE — 93010 ELECTROCARDIOGRAM REPORT: CPT | Performed by: STUDENT IN AN ORGANIZED HEALTH CARE EDUCATION/TRAINING PROGRAM

## 2024-07-28 PROCEDURE — 99233 SBSQ HOSP IP/OBS HIGH 50: CPT | Mod: 25 | Performed by: INTERNAL MEDICINE

## 2024-07-28 PROCEDURE — 700101 HCHG RX REV CODE 250: Performed by: STUDENT IN AN ORGANIZED HEALTH CARE EDUCATION/TRAINING PROGRAM

## 2024-07-28 PROCEDURE — A9270 NON-COVERED ITEM OR SERVICE: HCPCS

## 2024-07-28 RX ORDER — OXYCODONE HYDROCHLORIDE 10 MG/1
10 TABLET ORAL
Status: DISCONTINUED | OUTPATIENT
Start: 2024-07-28 | End: 2024-07-29 | Stop reason: HOSPADM

## 2024-07-28 RX ORDER — OXYCODONE HYDROCHLORIDE 5 MG/1
5 TABLET ORAL
Status: DISCONTINUED | OUTPATIENT
Start: 2024-07-28 | End: 2024-07-29 | Stop reason: HOSPADM

## 2024-07-28 RX ORDER — HYDROMORPHONE HYDROCHLORIDE 1 MG/ML
0.5 INJECTION, SOLUTION INTRAMUSCULAR; INTRAVENOUS; SUBCUTANEOUS
Status: DISCONTINUED | OUTPATIENT
Start: 2024-07-28 | End: 2024-07-29 | Stop reason: HOSPADM

## 2024-07-28 RX ADMIN — THERA TABS 1 TABLET: TAB at 05:17

## 2024-07-28 RX ADMIN — METRONIDAZOLE 500 MG: 500 TABLET ORAL at 17:52

## 2024-07-28 RX ADMIN — METRONIDAZOLE 500 MG: 500 TABLET ORAL at 05:16

## 2024-07-28 RX ADMIN — OMEPRAZOLE 20 MG: 20 CAPSULE, DELAYED RELEASE ORAL at 05:17

## 2024-07-28 RX ADMIN — APIXABAN 5 MG: 5 TABLET, FILM COATED ORAL at 17:52

## 2024-07-28 RX ADMIN — OXYCODONE HYDROCHLORIDE 10 MG: 10 TABLET ORAL at 05:17

## 2024-07-28 RX ADMIN — APIXABAN 5 MG: 5 TABLET, FILM COATED ORAL at 05:16

## 2024-07-28 RX ADMIN — OMEPRAZOLE 20 MG: 20 CAPSULE, DELAYED RELEASE ORAL at 17:52

## 2024-07-28 RX ADMIN — SENNOSIDES AND DOCUSATE SODIUM 2 TABLET: 50; 8.6 TABLET ORAL at 17:52

## 2024-07-28 RX ADMIN — OXYCODONE HYDROCHLORIDE 10 MG: 10 TABLET ORAL at 23:57

## 2024-07-28 RX ADMIN — CEFTRIAXONE SODIUM 2000 MG: 10 INJECTION, POWDER, FOR SOLUTION INTRAVENOUS at 20:10

## 2024-07-28 RX ADMIN — METOPROLOL TARTRATE 12.5 MG: 25 TABLET, FILM COATED ORAL at 05:16

## 2024-07-28 RX ADMIN — OXYCODONE HYDROCHLORIDE 10 MG: 10 TABLET ORAL at 20:10

## 2024-07-28 RX ADMIN — TRAZODONE HYDROCHLORIDE 200 MG: 50 TABLET ORAL at 20:10

## 2024-07-28 ASSESSMENT — ENCOUNTER SYMPTOMS
SHORTNESS OF BREATH: 0
INSOMNIA: 0
VOMITING: 1
FOCAL WEAKNESS: 0
WEAKNESS: 0
HEMOPTYSIS: 0
ABDOMINAL PAIN: 1
PALPITATIONS: 0
EYE PAIN: 0
NERVOUS/ANXIOUS: 0
EYE REDNESS: 0
TREMORS: 0
FALLS: 0
CHILLS: 0
CONSTIPATION: 0
MYALGIAS: 0
NAUSEA: 1
FEVER: 0
HEADACHES: 0
LOSS OF CONSCIOUSNESS: 0
WHEEZING: 0
BLOOD IN STOOL: 0
SEIZURES: 0
DIARRHEA: 0
COUGH: 0
DIZZINESS: 0

## 2024-07-28 ASSESSMENT — PAIN DESCRIPTION - PAIN TYPE: TYPE: ACUTE PAIN

## 2024-07-28 NOTE — CARE PLAN
The patient is Stable - Low risk of patient condition declining or worsening    Shift Goals  Clinical Goals: Pain control, mobility, safety  Patient Goals: Pain control, mobility  Family Goals: N/A    Progress made toward(s) clinical / shift goals:        Problem: Pain - Standard  Goal: Alleviation of pain or a reduction in pain to the patient’s comfort goal  Outcome: Progressing     Problem: Knowledge Deficit - Standard  Goal: Patient and family/care givers will demonstrate understanding of plan of care, disease process/condition, diagnostic tests and medications  Outcome: Progressing     Problem: Hemodynamics  Goal: Patient's hemodynamics, fluid balance and neurologic status will be stable or improve  Outcome: Progressing       Patient is not progressing towards the following goals:

## 2024-07-28 NOTE — ASSESSMENT & PLAN NOTE
Vitals:    07/28/24 1628   BP: 114/77   Pulse: 67   Resp:    Temp:    SpO2: 93%     Improved with BB started anticoag

## 2024-07-28 NOTE — PROGRESS NOTES
"Hospital Medicine Daily Progress Note    Date of Service  7/27/2024    Chief Complaint  Babar Yuan is a 74 y.o. male admitted 7/24/2024 with Abdominal Pain ([facility] with abd pain, CT revealed bowel obstruction. NG tube placed, pt given 0.5 mg dilaudid, 2L IVF, and \"unknown antibiotics\" x 2 PTA. )        Hospital Course  No notes on file    Interval Problem Update  Patient seen and examine at bedside  I reviewed the chart along with vitals, labs, imaging, test (both pending and resulted) and recommendations from specialists and interdisciplinary team.  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 He first presented with nausea, vomiting and abdominal pain. [facility], BUN 70, Cr- 1.6, lactic acid 2.5 Procalcitonin 0.07. CTA showed arkedly dilated distal bowel loops including dilatation of the iliac pouch increased from prior, with transition occurring at the distal pouch anastomotic suture, complex 1 cm left upper pole renal lesion suspicious for neoplasm such as a renal cell carcinoma. Patient given antibiotics. Surgery consulted awaiting CT images to load meanwhile patient made NPO for potential procedure.   Managing SBO, complicated surgery.   Nursing noted that patient was wanting surgical plan and was threatening to leave AMA. He had some output from his stoma. They mentioned that there has been normal output now. I spoke with patient and wife and deescalated the situation. Despite normal output, we do not know the degree of obstruction and need for decompression. We reviewed the CT findings. I called surgeon. The team came at bedside and had now informed us that they had consulted the GI team regarding possible dilation. " Meanwhile the surgical team reviewed the CT scan and explained to wife and patient regarding the degree of obstruction at the anastomotic site. Meanwhile, patient wanted to eat. I spoke to them that we will need to wait for GI as possible procedure may be done. I spoke with Dr. Tapia. On the scan the obstruction is actually quite iong that unlikely dilation may be done however given that there is still a lot of fluid collection in the intestines will still need an enteroscopy through the stoma for decompression. They will try their best to do it today. They recommend against advancing diet as this will worsen the obstruction. GI came at bedside and reviewed the enteroscopic findings. Patient's and wifes questions were answered to satisfaction.   S/p enteroscopic decompression drainage with Grimaldo by Dr. Tapia 7/27/2024. Cleared by GI. On empiric antibiotics, leukocytosis has resolved, 7/25 blood cultures NGTD. Postprocedure Afib on EKG ordered echo PENDING, TSH, CHADSVAsc is 1-2 (if we count IBD as vasc disease) start anticoag (okayed by GI)     Discussed with patient at bedside. Discussed Afib and anticoag, rate control, antiarrhythmic. He is agreeable to anticoagulation. WIll observe him for a day or two. He is planning to live with family locally and is open to [facility].     I have discussed this patient's plan of care and discharge plan at IDT rounds today with Case Management, Nursing, Nursing leadership, and other members of the IDT team.    Consultants/Specialty  general surgery and GI    Code Status  DNAR/DNI    Disposition  The patient is not medically cleared for discharge to home or a post-acute facility.      I have placed the appropriate orders for post-discharge needs.    Review of Systems  Review of Systems   Constitutional:  Negative for chills and fever.   HENT:  Negative for congestion, hearing loss and nosebleeds.    Eyes:  Negative for pain and redness.   Respiratory:  Negative for cough, hemoptysis,  shortness of breath and wheezing.    Cardiovascular:  Negative for chest pain and palpitations.   Gastrointestinal:  Positive for abdominal pain, nausea and vomiting. Negative for blood in stool, constipation and diarrhea.   Genitourinary:  Negative for dysuria, frequency and hematuria.   Musculoskeletal:  Negative for falls, joint pain and myalgias.   Skin:  Negative for rash.   Neurological:  Negative for dizziness, tremors, focal weakness, seizures, loss of consciousness, weakness and headaches.   Psychiatric/Behavioral:  The patient is not nervous/anxious and does not have insomnia.    All other systems reviewed and are negative.       Physical Exam  Temp:  [36 °C (96.8 °F)-37.1 °C (98.8 °F)] 36.4 °C (97.5 °F)  Pulse:  [] 72  Resp:  [14-17] 15  BP: ()/(59-79) 109/68  SpO2:  [92 %-97 %] 93 %    Physical Exam  Vitals and nursing note reviewed.   HENT:      Head: Normocephalic and atraumatic.      Right Ear: External ear normal.      Left Ear: External ear normal.      Nose: Nose normal.      Mouth/Throat:      Mouth: Mucous membranes are moist.   Eyes:      General: No scleral icterus.     Conjunctiva/sclera: Conjunctivae normal.   Cardiovascular:      Rate and Rhythm: Normal rate. Rhythm irregular.      Heart sounds: No murmur heard.     No friction rub. No gallop.   Pulmonary:      Effort: Pulmonary effort is normal.      Breath sounds: Normal breath sounds.   Abdominal:      General: Abdomen is flat. Bowel sounds are normal. There is distension (mild but soft).      Palpations: Abdomen is soft.      Tenderness: There is no abdominal tenderness. There is no guarding.      Comments: Grimaldo catheter directly to stoma   Musculoskeletal:         General: Normal range of motion.      Cervical back: Normal range of motion and neck supple.   Skin:     General: Skin is warm.   Neurological:      Mental Status: He is alert and oriented to person, place, and time. Mental status is at baseline.   Psychiatric:          Mood and Affect: Mood normal.         Behavior: Behavior normal.         Thought Content: Thought content normal.         Judgment: Judgment normal.      Comments: Amxious         Fluids    Intake/Output Summary (Last 24 hours) at 7/27/2024 1725  Last data filed at 7/27/2024 1136  Gross per 24 hour   Intake 806.67 ml   Output 150 ml   Net 656.67 ml       Laboratory  Recent Labs     07/25/24  0113 07/26/24  0431 07/27/24  0459   WBC 11.4* 9.4 8.7   RBC 4.77 4.58* 4.58*   HEMOGLOBIN 13.9* 13.3* 13.5*   HEMATOCRIT 40.9* 39.9* 40.5*   MCV 85.7 87.1 88.4   MCH 29.1 29.0 29.5   MCHC 34.0 33.3 33.3   RDW 43.8 45.8 45.6   PLATELETCT 384 328 352   MPV 9.1 9.0 8.9*     Recent Labs     07/25/24  0113 07/26/24  0431 07/27/24  0459   SODIUM 136 137 139   POTASSIUM 4.2 3.6 3.8   CHLORIDE 102 104 103   CO2 20 23 25   GLUCOSE 143* 95 89   BUN 16 18 13   CREATININE 1.34 1.28 1.34   CALCIUM 8.2* 8.3* 8.2*     Recent Labs     07/25/24  0204   INR 1.20*               Imaging  EC-ECHOCARDIOGRAM COMPLETE W/O CONT   Final Result      DX-ABDOMEN FOR TUBE PLACEMENT   Final Result         1.  Left infrahilar infiltrates   2.  Atherosclerosis   3.  Air-filled distention of bowel in the upper abdomen, consider ileus and/or enteritis versus evolving obstructive changes.      OUTSIDE IMAGES-CT ABDOMEN /PELVIS   Final Result      OUTSIDE IMAGES-DX CHEST   Final Result           Assessment/Plan  * SBO (small bowel obstruction) ([facility])- (present on admission)  Assessment & Plan  [facility]  Pt had BCIR 30years ago for IBD/colitis    NPO, bowel rest  IV Protonix  Supportive pain control  NG decompression  Surgery consulted, GI consulted enteroscopy through stoma recommended tonight  S/p enteroscopy with Grimaldo.    Atrial fibrillation with RVR (HCC)  Assessment & Plan  Vitals:    07/27/24 1530   BP: 109/68   Pulse: 72   Resp: 15   Temp: 36.4 °C (97.5 °F)   SpO2: 93%     Improved with BB started anticoag    Renal  lesion  Assessment & Plan  Left sided 1cm renal lesion seen on CT AP  Discussed with patient, he is already aware of this. I advised to follow up with PCP outpatient, he expressed understanding  Follow up with Urology for this.    ACP (advance care planning)  Assessment & Plan  Goal care discussed with patient in ER.  He stated he does not wish to be kept alive on a machine in a vegetative state.  He also clarified he has advanced directive POLST at home.  Patient is agreeable for no invasive or heroic life-sustaining measures-no CPR/defibrillation/intubation mechanical ventilation.  He is however agreeable for [facility] invasive medical management, including IVF, IV analgesia, IV antibiotic as clinically warranted, as well as evaluation by surgery service.  Diagnosis, prognosis, question and concern addressed.  DNR/denies status confirmed.  ACP: 17 minutes    Sepsis ([facility])  Assessment & Plan  This is Sepsis Present on admission  SIRS criteria identified on my evaluation include: Tachycardia, with heart rate greater than 90 BPM, Leukocytosis, with WBC greater than 12,000, and Bandemia, greater than 10% bands  Clinical indicators of end organ dysfunction include Lactic Acid greater than 2  Source is GI  Sepsis protocol initiated  Crystalloid Fluid Administration: Fluid resuscitation ordered per standard protocol - 30 mL/kg per current or ideal body weight  IV antibiotics as appropriate for source of sepsis  Reassessment: I have reassessed the patient's hemodynamic status    WBC 16.48k, LA 2.5  IVF  Abx: ceftriaxone, flagyl  Follow cultures    TRISTAN (acute kidney injury) (HCC)  Assessment & Plan  Creatinine 1.6, unknown baseline  IVF  Minimize nephrotoxic agents, renally dose meds  Check FeNa  Cr- now 1.3 improved    IBD (inflammatory bowel disease)  Assessment & Plan  Resume home medications once able to verify       VTE prophylaxis: SCDs    I have performed a physical exam and reviewed and updated ROS and Plan today  (7/27/2024). In review of yesterday's note (7/26/2024), there are no changes except as documented above.    Patient is has a high medical complexity, complex decision making and is at high risk for complication, morbidity, and mortality, thus requiring the highest level of my preparedness for sudden, emergent intervention. Medical decision making is therefore complex. I provided  services, which included ordering labs and/or imaging, and discussing the case with various consultants.medication orders, frequent reevaluations of the patient's condition and response to treatment. Time was also devoted to counseling and coordinating care including review of records, pertinent lab data and studies, as well as discussing diagnostic evaluation and work up, planned therapeutic interventions and future disposition of care. Where indicated, the assessment and plan reflect discussion of patient with consultants, other healthcare providers, family members, and additional research needed to obtain further information in formulating the plan of care for Babar Yuan. Total time spent was 65 minutes.

## 2024-07-29 VITALS
HEART RATE: 50 BPM | BODY MASS INDEX: 22.63 KG/M2 | TEMPERATURE: 98.2 F | WEIGHT: 135.8 LBS | RESPIRATION RATE: 16 BRPM | OXYGEN SATURATION: 95 % | HEIGHT: 65 IN | DIASTOLIC BLOOD PRESSURE: 68 MMHG | SYSTOLIC BLOOD PRESSURE: 121 MMHG

## 2024-07-29 LAB
ALBUMIN SERPL BCP-MCNC: 2.6 G/DL (ref 3.2–4.9)
BUN SERPL-MCNC: 11 MG/DL (ref 8–22)
CALCIUM ALBUM COR SERPL-MCNC: 8.9 MG/DL (ref 8.5–10.5)
CALCIUM SERPL-MCNC: 7.8 MG/DL (ref 8.5–10.5)
CHLORIDE SERPL-SCNC: 105 MMOL/L (ref 96–112)
CO2 SERPL-SCNC: 23 MMOL/L (ref 20–33)
CREAT SERPL-MCNC: 1.1 MG/DL (ref 0.5–1.4)
EKG IMPRESSION: NORMAL
ERYTHROCYTE [DISTWIDTH] IN BLOOD BY AUTOMATED COUNT: 45 FL (ref 35.9–50)
GFR SERPLBLD CREATININE-BSD FMLA CKD-EPI: 70 ML/MIN/1.73 M 2
GLUCOSE SERPL-MCNC: 90 MG/DL (ref 65–99)
HCT VFR BLD AUTO: 37.5 % (ref 42–52)
HGB BLD-MCNC: 12.5 G/DL (ref 14–18)
MCH RBC QN AUTO: 29.3 PG (ref 27–33)
MCHC RBC AUTO-ENTMCNC: 33.3 G/DL (ref 32.3–36.5)
MCV RBC AUTO: 88 FL (ref 81.4–97.8)
PHOSPHATE SERPL-MCNC: 1.9 MG/DL (ref 2.5–4.5)
PLATELET # BLD AUTO: 339 K/UL (ref 164–446)
PMV BLD AUTO: 9 FL (ref 9–12.9)
POTASSIUM SERPL-SCNC: 3.2 MMOL/L (ref 3.6–5.5)
RBC # BLD AUTO: 4.26 M/UL (ref 4.7–6.1)
SODIUM SERPL-SCNC: 137 MMOL/L (ref 135–145)
WBC # BLD AUTO: 11.6 K/UL (ref 4.8–10.8)

## 2024-07-29 PROCEDURE — 85027 COMPLETE CBC AUTOMATED: CPT

## 2024-07-29 PROCEDURE — 80069 RENAL FUNCTION PANEL: CPT

## 2024-07-29 PROCEDURE — 93005 ELECTROCARDIOGRAM TRACING: CPT | Performed by: INTERNAL MEDICINE

## 2024-07-29 PROCEDURE — 99239 HOSP IP/OBS DSCHRG MGMT >30: CPT | Performed by: INTERNAL MEDICINE

## 2024-07-29 PROCEDURE — A9270 NON-COVERED ITEM OR SERVICE: HCPCS | Performed by: INTERNAL MEDICINE

## 2024-07-29 PROCEDURE — 700102 HCHG RX REV CODE 250 W/ 637 OVERRIDE(OP): Performed by: INTERNAL MEDICINE

## 2024-07-29 PROCEDURE — 93010 ELECTROCARDIOGRAM REPORT: CPT | Performed by: INTERNAL MEDICINE

## 2024-07-29 RX ORDER — CEFUROXIME AXETIL 500 MG/1
500 TABLET ORAL 2 TIMES DAILY
Qty: 4 TABLET | Refills: 0 | Status: ACTIVE | OUTPATIENT
Start: 2024-07-29 | End: 2024-07-29

## 2024-07-29 RX ORDER — METRONIDAZOLE 500 MG/1
500 TABLET ORAL EVERY 12 HOURS
Qty: 4 TABLET | Refills: 0 | Status: ACTIVE | OUTPATIENT
Start: 2024-07-29 | End: 2024-07-29

## 2024-07-29 RX ORDER — METRONIDAZOLE 500 MG/1
500 TABLET ORAL EVERY 12 HOURS
Qty: 4 TABLET | Refills: 0 | Status: ACTIVE | OUTPATIENT
Start: 2024-07-29 | End: 2024-07-31

## 2024-07-29 RX ORDER — ACETAMINOPHEN 325 MG/1
650 TABLET ORAL EVERY 6 HOURS PRN
COMMUNITY
Start: 2024-07-29

## 2024-07-29 RX ORDER — CEFUROXIME AXETIL 500 MG/1
500 TABLET ORAL 2 TIMES DAILY
Qty: 4 TABLET | Refills: 0 | Status: ACTIVE | OUTPATIENT
Start: 2024-07-29 | End: 2024-07-31

## 2024-07-29 RX ORDER — ONDANSETRON 4 MG/1
4 TABLET, ORALLY DISINTEGRATING ORAL EVERY 8 HOURS PRN
Qty: 10 TABLET | Refills: 0 | Status: SHIPPED | OUTPATIENT
Start: 2024-07-29

## 2024-07-29 RX ORDER — AMOXICILLIN 250 MG
2 CAPSULE ORAL EVERY EVENING
COMMUNITY
Start: 2024-07-29

## 2024-07-29 RX ADMIN — OMEPRAZOLE 20 MG: 20 CAPSULE, DELAYED RELEASE ORAL at 06:03

## 2024-07-29 RX ADMIN — METRONIDAZOLE 500 MG: 500 TABLET ORAL at 06:03

## 2024-07-29 RX ADMIN — OXYCODONE HYDROCHLORIDE 10 MG: 10 TABLET ORAL at 06:06

## 2024-07-29 RX ADMIN — APIXABAN 5 MG: 5 TABLET, FILM COATED ORAL at 06:03

## 2024-07-29 RX ADMIN — THERA TABS 1 TABLET: TAB at 06:03

## 2024-07-29 ASSESSMENT — PAIN DESCRIPTION - PAIN TYPE: TYPE: ACUTE PAIN

## 2024-07-29 NOTE — DISCHARGE PLANNING
Case Management Discharge Planning    Admission Date: 7/24/2024  GMLOS: 3.6  ALOS: 4    6-Clicks ADL Score:    6-Clicks Mobility Score:        Anticipated Discharge Dispo: Discharge Disposition: D/T to home under [facility]HA care in anticipation of covered skilled care (06)    DME Needed: No    Action(s) Taken: Updated Provider/Nurse on Discharge Plan    Pt was discussed in IDT rounds with Dr Jaime. Pt can potentially discharge to home today with Wife/family members on Home Health.   Pt does not own any DMEs and is ambulatory prior to this hospitalization .  Pt and his wife will stay in [place] until the end of Aug then head to [place] before heading back to [place].    Per Pt s wife Jessenia  , Pt s Daughter is an RN who s  is an MD.  Pt s Home Health choices are [facility] and [facility]. Choice faxed to Kath BRITT.    [facility], [facility] with [facility]    Pt s address: [address] [place] [address] [address] [place] [address].    Pt has been  accepted with [facility] .    Kimberly of [facility] informed that Pt is discharging today. Per Kimberly their Mobile MD will sign /follow for  . Informed Dr Jaime  and RN Lin.         Escalations Completed: None    Medically Clear: No    Next Steps:  CM to continue to assist Pt with discharge as needed    Barriers to Discharge:   Medical clearance      Is the patient up for discharge tomorrow: No

## 2024-07-29 NOTE — DISCHARGE INSTRUCTIONS
Bowel Obstruction  A bowel obstruction means that something is blocking the small or large bowel. The bowel is also called the intestine. It is the long tube that connects the stomach to the opening of the butt (anus).  When something blocks the bowel, food and fluids cannot pass through like normal. This condition needs to be treated. Treatment depends on the cause of the problem and how bad the problem is.  What are the causes?  Common causes of this condition include:  Scar tissue inside the body from past surgery or from high-energy X-rays (radiation).  Recent surgery in the belly. This affects how food moves in the bowel.  Some diseases, such as:  Irritation of the lining of the digestive tract (Crohn's disease).  Irritation of small pouches in the bowel (diverticulitis).  Growths or tumors.  A bulging organ or tissue (hernia).  Twisting of the bowel (volvulus).  A swallowed object.  Slipping of a part of the bowel into another part (intussusception).  What are the signs or symptoms?  Symptoms of this condition include:  Pain in the belly.  Feeling like you may vomit (nauseous).  Vomiting.  Bloating in the belly.  Being unable to pass gas.  Trouble pooping (constipation).  A lot of belching.  Watery poop (diarrhea).  How is this treated?  Treatment for this condition may include:  Fluids and pain medicines that are given through an IV tube. Your doctor may tell you not to eat or drink if you feel like you may vomit or are vomiting.  Eating a clear liquid diet for a few days.  Putting a small tube (nasogastric tube) through the nose, down the throat, and into the stomach. This will help with pain, discomfort, and the feeling like you may vomit.  Surgery. This may be needed if other treatments do not work.  Follow these instructions at home:  Medicines  Take over-the-counter and prescription medicines only as told by your doctor.  If you were prescribed an antibiotic medicine, take it as told by your doctor. Do  not stop taking the antibiotic even if you start to feel better.  General instructions  Follow instructions from your doctor about what you can or cannot eat and drink. You may need to:  Only drink clear liquids until you start to get better.  Avoid solid foods.  Return to your normal activities when your doctor says that it is safe.  Rest as told by your doctor.  Get up to take short walks every 1 to 2 hours. Ask for help if you feel weak or unsteady.  Keep all follow-up visits.  How is this prevented?  After having a bowel obstruction, you may be more likely to have another. You can do some things to stop it from happening again.  If you have a long-term (chronic) disease, contact your doctor if you see changes or problems.  Avoid having trouble pooping. You may need to take these actions to prevent or treat trouble pooping:  Drink enough fluid to keep your pee (urine) pale yellow.  Take over-the-counter or prescription medicines.  Eat foods that are high in fiber. These include beans, whole grains, and fresh fruits and vegetables.  Limit foods that are high in fat and sugar. These include fried or sweet foods.  Stay active. Ask your doctor which exercises are safe for you.  Avoid stress.  Eat three small meals and three small snacks each day.  Work with a diet and nutrition expert (dietitian) to make a meal plan that works for you.  Do not smoke or use any products that contain nicotine or tobacco. If you need help quitting, ask your doctor.  Contact a doctor if:  You have a fever.  You have chills.  Get help right away if:  You have pain or cramps that get worse.  You vomit blood.  You feel like you may vomit and the feeling lasts a long time.  You cannot stop vomiting.  You cannot drink fluids.  You feel confused.  You feel very thirsty (dehydrated).  Your belly gets more bloated.  You feel weak or you faint.  Summary  A bowel obstruction means that something is blocking the small or large bowel.  Treatment may  include IV fluids and pain medicine. You may also have a clear liquid diet, a small tube in your stomach, or surgery.  Drink clear liquids and avoid solid foods until you get better, as told by your doctor.  This information is not intended to replace advice given to you by your health care provider. Make sure you discuss any questions you have with your health care provider.  Document Revised: 01/30/2022 Document Reviewed: 01/30/2022  [employer] Patient Education © 2023 [employer]      Atrial Fibrillation    Atrial fibrillation is a type of heartbeat that is irregular or fast. If you have this condition, your heart beats without any order. This makes it hard for your heart to pump blood in a normal way.  Atrial fibrillation may come and go, or it may become a long-lasting problem. If this condition is not treated, it can put you at higher risk for stroke, heart failure, and other heart problems.  What are the causes?  This condition may be caused by diseases that damage the heart. They include:  High blood pressure.  Heart failure.  Heart valve disease.  Heart surgery.  Other causes include:  Diabetes.  Thyroid disease.  Being overweight.  Kidney disease.  Sometimes the cause is not known.  What increases the risk?  You are more likely to develop this condition if:  You are older.  You smoke.  You exercise often and very hard.  You have a family history of this condition.  You are a man.  You use drugs.  You drink a lot of alcohol.  You have lung conditions, such as emphysema, pneumonia, or COPD.  You have sleep apnea.  What are the signs or symptoms?  Common symptoms of this condition include:  A feeling that your heart is beating very fast.  Chest pain or discomfort.  Feeling short of breath.  Suddenly feeling light-headed or weak.  Getting tired easily during activity.  Fainting.  Sweating.  In some cases, there are no symptoms.  How is this treated?  Treatment for this condition depends on underlying  conditions and how you feel when you have atrial fibrillation. They include:  Medicines to:  Prevent blood clots.  Treat heart rate or heart rhythm problems.  Using devices, such as a pacemaker, to correct heart rhythm problems.  Doing surgery to remove the part of the heart that sends bad signals.  Closing an area where clots can form in the heart (left atrial appendage).  In some cases, your doctor will treat other underlying conditions.  Follow these instructions at home:  Medicines  Take over-the-counter and prescription medicines only as told by your doctor.  Do not take any new medicines without first talking to your doctor.  If you are taking blood thinners:  Talk with your doctor before you take any medicines that have aspirin or NSAIDs, such as ibuprofen, in them.  Take your medicine exactly as told by your doctor. Take it at the same time each day.  Avoid activities that could hurt or bruise you. Follow instructions about how to prevent falls.  Wear a bracelet that says you are taking blood thinners. Or, carry a card that lists what medicines you take.  Lifestyle         Do not use any products that have nicotine or tobacco in them. These include cigarettes, e-cigarettes, and chewing tobacco. If you need help quitting, ask your doctor.  Eat heart-healthy foods. Talk with your doctor about the right eating plan for you.  Exercise regularly as told by your doctor.  Do not drink alcohol.  Lose weight if you are overweight.  Do not use drugs, including cannabis.  General instructions  If you have a condition that causes breathing to stop for a short period of time (apnea), treat it as told by your doctor.  Keep a healthy weight. Do not use diet pills unless your doctor says they are safe for you. Diet pills may make heart problems worse.  Keep all follow-up visits as told by your doctor. This is important.  Contact a doctor if:  You notice a change in the speed, rhythm, or strength of your heartbeat.  You are  "taking a blood-thinning medicine and you get more bruising.  You get tired more easily when you move or exercise.  You have a sudden change in weight.  Get help right away if:    You have pain in your chest or your belly (abdomen).  You have trouble breathing.  You have side effects of blood thinners, such as blood in your vomit, poop (stool), or pee (urine), or bleeding that cannot stop.  You have any signs of a stroke. \"BE FAST\" is an easy way to remember the main warning signs:  B - Balance. Signs are dizziness, sudden trouble walking, or loss of balance.  E - Eyes. Signs are trouble seeing or a change in how you see.  F - Face. Signs are sudden weakness or loss of feeling in the face, or the face or eyelid drooping on one side.  A - Arms. Signs are weakness or loss of feeling in an arm. This happens suddenly and usually on one side of the body.  S - Speech. Signs are sudden trouble speaking, slurred speech, or trouble understanding what people say.  T - Time. Time to call emergency services. Write down what time symptoms started.  You have other signs of a stroke, such as:  A sudden, very bad headache with no known cause.  Feeling like you may vomit (nausea).  Vomiting.  A seizure.  These symptoms may be an emergency. Do not wait to see if the symptoms will go away. Get medical help right away. Call your local emergency services (911 in the [place] Do not drive yourself to the hospital.  Summary  Atrial fibrillation is a type of heartbeat that is irregular or fast.  You are at higher risk of this condition if you smoke, are older, have diabetes, or are overweight.  Follow your doctor's instructions about medicines, diet, exercise, and follow-up visits.  Get help right away if you have signs or symptoms of a stroke.  Get help right away if you cannot catch your breath, or you have chest pain or discomfort.  This information is not intended to replace advice given to you by your health care provider. Make sure you " discuss any questions you have with your health care provider.  Document Revised: 06/10/2020 Document Reviewed: 06/10/2020  [employer]vier Patient Education © 2023 [employer]

## 2024-07-29 NOTE — PROGRESS NOTES
"Hospital Medicine Daily Progress Note    Date of Service  7/28/2024    Chief Complaint  Babar Yuan is a 74 y.o. male admitted 7/24/2024 with Abdominal Pain ([facility] with abd pain, CT revealed bowel obstruction. NG tube placed, pt given 0.5 mg dilaudid, 2L IVF, and \"unknown antibiotics\" x 2 PTA. )        Hospital Course  No notes on file    Interval Problem Update  Patient seen and examine at bedside  I reviewed the chart along with vitals, labs, imaging, test (both pending and resulted) and recommendations from specialists and interdisciplinary team.  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 He first presented with nausea, vomiting and abdominal pain. [facility], BUN 70, Cr- 1.6, lactic acid 2.5 Procalcitonin 0.07. CTA showed arkedly dilated distal bowel loops including dilatation of the iliac pouch increased from prior, with transition occurring at the distal pouch anastomotic suture, complex 1 cm left upper pole renal lesion suspicious for neoplasm such as a renal cell carcinoma. Patient given antibiotics. Surgery consulted awaiting CT images to load meanwhile patient made NPO for potential procedure.   Managing SBO, complicated surgery.   Nursing noted that patient was wanting surgical plan and was threatening to leave AMA. He had some output from his stoma. They mentioned that there has been normal output now. I spoke with patient and wife and deescalated the situation. Despite normal output, we do not know the degree of obstruction and need for decompression. We reviewed the CT findings. I called surgeon. The team came at bedside and had now informed us that they had consulted the GI team regarding possible dilation. " Meanwhile the surgical team reviewed the CT scan and explained to wife and patient regarding the degree of obstruction at the anastomotic site. Meanwhile, patient wanted to eat. I spoke to them that we will need to wait for GI as possible procedure may be done. I spoke with Dr. Tapia. On the scan the obstruction is actually quite iong that unlikely dilation may be done however given that there is still a lot of fluid collection in the intestines will still need an enteroscopy through the stoma for decompression. They will try their best to do it today. They recommend against advancing diet as this will worsen the obstruction. GI came at bedside and reviewed the enteroscopic findings. Patient's and wifes questions were answered to satisfaction.   S/p enteroscopic decompression drainage with Grimaldo by Dr. Tapia 7/27/2024. Cleared by GI. On empiric antibiotics, leukocytosis has resolved, 7/25 blood cultures NGTD. Postprocedure Afib on EKG ordered echo cmae back EF 60% RV normal, TSH, CHADSVAsc is 1-2 (if we count IBD as vasc disease) started anticoagulation (okayed by GI). Discussed with patient and wife, they are agreeable to anticoagulation. I started Eliquis.      Long talk with patient and wife who had questions then daughter who had concerns. Told them Eliquis dosing, Told them that BB can be dosed to keep HR at least . If there are concerns for bradycardia, hypotension or dizziness, BB can be stopped and other alternative rate controlers can be used digoxin. Ultimately if patient develops sick sinus and only then can we consider pacer if he has symptomatic bradycardia or sustained HR<40. I explained cardioversion, need for anticoagulaiton first before cardioversion if patient doesn;t spontaneously convert, which happens 50% or more of the time. Daughter then concerned about need for cardiac monitoring. I spoke with staff. We agreed IF HR<55 or >100 sustained then we can TRANSFER TO TELEMETRY.  Will get an EKG  before discharge to see if he converts. We are planning to discharge TOMORROW with [facility].       I have discussed this patient's plan of care and discharge plan at IDT rounds today with Case Management, Nursing, Nursing leadership, and other members of the IDT team.    Consultants/Specialty  general surgery and GI    Code Status  DNAR/DNI    Disposition  The patient is not medically cleared for discharge to home or a post-acute facility.      I have placed the appropriate orders for post-discharge needs.    Review of Systems  Review of Systems   Constitutional:  Negative for chills and fever.   HENT:  Negative for congestion, hearing loss and nosebleeds.    Eyes:  Negative for pain and redness.   Respiratory:  Negative for cough, hemoptysis, shortness of breath and wheezing.    Cardiovascular:  Negative for chest pain and palpitations.   Gastrointestinal:  Positive for abdominal pain, nausea and vomiting. Negative for blood in stool, constipation and diarrhea.   Genitourinary:  Negative for dysuria, frequency and hematuria.   Musculoskeletal:  Negative for falls, joint pain and myalgias.   Skin:  Negative for rash.   Neurological:  Negative for dizziness, tremors, focal weakness, seizures, loss of consciousness, weakness and headaches.   Psychiatric/Behavioral:  The patient is not nervous/anxious and does not have insomnia.    All other systems reviewed and are negative.       Physical Exam  Temp:  [36.4 °C (97.5 °F)-36.9 °C (98.4 °F)] 36.4 °C (97.5 °F)  Pulse:  [] 67  Resp:  [16-18] 16  BP: ()/(49-77) 114/77  SpO2:  [91 %-96 %] 93 %    Physical Exam  Vitals and nursing note reviewed.   HENT:      Head: Normocephalic and atraumatic.      Right Ear: External ear normal.      Left Ear: External ear normal.      Nose: Nose normal.      Mouth/Throat:      Mouth: Mucous membranes are moist.   Eyes:      General: No scleral icterus.     Conjunctiva/sclera: Conjunctivae normal.   Cardiovascular:      Rate and  Rhythm: Normal rate. Rhythm irregular.      Heart sounds: No murmur heard.     No friction rub. No gallop.   Pulmonary:      Effort: Pulmonary effort is normal.      Breath sounds: Normal breath sounds.   Abdominal:      General: Abdomen is flat. Bowel sounds are normal. There is distension (mild but soft).      Palpations: Abdomen is soft.      Tenderness: There is no abdominal tenderness. There is no guarding.      Comments: Grimaldo catheter directly to stoma   Musculoskeletal:         General: Normal range of motion.      Cervical back: Normal range of motion and neck supple.   Skin:     General: Skin is warm.   Neurological:      Mental Status: He is alert and oriented to person, place, and time. Mental status is at baseline.   Psychiatric:         Mood and Affect: Mood normal.         Behavior: Behavior normal.         Thought Content: Thought content normal.         Judgment: Judgment normal.      Comments: Amxious         Fluids    Intake/Output Summary (Last 24 hours) at 7/28/2024 1722  Last data filed at 7/28/2024 0530  Gross per 24 hour   Intake --   Output 800 ml   Net -800 ml       Laboratory  Recent Labs     07/26/24  0431 07/27/24  0459   WBC 9.4 8.7   RBC 4.58* 4.58*   HEMOGLOBIN 13.3* 13.5*   HEMATOCRIT 39.9* 40.5*   MCV 87.1 88.4   MCH 29.0 29.5   MCHC 33.3 33.3   RDW 45.8 45.6   PLATELETCT 328 352   MPV 9.0 8.9*     Recent Labs     07/26/24  0431 07/27/24  0459 07/28/24  0624   SODIUM 137 139 136   POTASSIUM 3.6 3.8 3.8   CHLORIDE 104 103 103   CO2 23 25 22   GLUCOSE 95 89 118*   BUN 18 13 11   CREATININE 1.28 1.34 1.06   CALCIUM 8.3* 8.2* 8.3*                     Imaging  EC-ECHOCARDIOGRAM COMPLETE W/O CONT   Final Result      DX-ABDOMEN FOR TUBE PLACEMENT   Final Result         1.  Left infrahilar infiltrates   2.  Atherosclerosis   3.  Air-filled distention of bowel in the upper abdomen, consider ileus and/or enteritis versus evolving obstructive changes.      OUTSIDE IMAGES-CT ABDOMEN /PELVIS    Final Result      OUTSIDE IMAGES-DX CHEST   Final Result           Assessment/Plan  * SBO (small bowel obstruction) ([facility])- (present on admission)  Assessment & Plan  [facility]  Pt had BCIR 30years ago for IBD/colitis    NPO, bowel rest  IV Protonix  Supportive pain control  NG decompression  Surgery consulted, GI consulted enteroscopy through stoma recommended tonight  S/p enteroscopy with Grimaldo.    Atrial fibrillation with RVR ([facility])  Assessment & Plan  Vitals:    07/27/24 1530   BP: 109/68   Pulse: 72   Resp: 15   Temp: 36.4 °C (97.5 °F)   SpO2: 93%     Improved with BB started anticoag    Renal lesion  Assessment & Plan  Left sided 1cm renal lesion seen on CT AP  Discussed with patient, he is already aware of this. I advised to follow up with PCP outpatient, he expressed understanding  Follow up with Urology for this.    ACP (advance care planning)  Assessment & Plan  Goal care discussed with patient in ER.  He stated he does not wish to be kept alive on a machine in a vegetative state.  He also clarified he has advanced directive POLST at home.  Patient is agreeable for no invasive or heroic life-sustaining measures-no CPR/defibrillation/intubation mechanical ventilation.  He is however agreeable for [place] invasive medical management, including IVF, IV analgesia, IV antibiotic as clinically warranted, as well as evaluation by surgery service.  Diagnosis, prognosis, question and concern addressed.  DNR/denies status confirmed.  ACP: 17 minutes    Sepsis ([facility])  Assessment & Plan  This is Sepsis Present on admission  SIRS criteria identified on my evaluation include: Tachycardia, with heart rate greater than 90 BPM, Leukocytosis, with WBC greater than 12,000, and Bandemia, greater than 10% bands  Clinical indicators of end organ dysfunction include Lactic Acid greater than 2  Source is GI  Sepsis protocol initiated  Crystalloid Fluid Administration: Fluid resuscitation ordered per standard  protocol - 30 mL/kg per current or ideal body weight  IV antibiotics as appropriate for source of sepsis  Reassessment: I have reassessed the patient's hemodynamic status    WBC 16.48k, LA 2.5  IVF  Abx: ceftriaxone, flagyl  Follow cultures    TRISTAN (acute kidney injury) (HCC)  Assessment & Plan  Creatinine 1.6, unknown baseline  IVF  Minimize nephrotoxic agents, renally dose meds  Check FeNa  Cr- now 1.3 improved    IBD (inflammatory bowel disease)  Assessment & Plan  Resume home medications once able to verify       VTE prophylaxis: SCDs    I have performed a physical exam and reviewed and updated ROS and Plan today (7/28/2024). In review of yesterday's note (7/27/2024), there are no changes except as documented above.    Patient is has a high medical complexity, complex decision making and is at high risk for complication, morbidity, and mortality, thus requiring the highest level of my preparedness for sudden, emergent intervention. Medical decision making is therefore complex. I provided  services, which included ordering labs and/or imaging, and discussing the case with various consultants.medication orders, frequent reevaluations of the patient's condition and response to treatment. Time was also devoted to counseling and coordinating care including review of records, pertinent lab data and studies, as well as discussing diagnostic evaluation and work up, planned therapeutic interventions and future disposition of care. Where indicated, the assessment and plan reflect discussion of patient with consultants, other healthcare providers, family members, and additional research needed to obtain further information in formulating the plan of care for Babar Yuan. Total time spent was 70 minutes.

## 2024-07-30 LAB
BACTERIA BLD CULT: NORMAL
BACTERIA BLD CULT: NORMAL
SIGNIFICANT IND 70042: NORMAL
SIGNIFICANT IND 70042: NORMAL
SITE SITE: NORMAL
SITE SITE: NORMAL
SOURCE SOURCE: NORMAL
SOURCE SOURCE: NORMAL

## 2024-07-30 NOTE — DOCUMENTATION QUERY
[facility]                                                                       Query Response Note      PATIENT:               FREDDIE COLLINS  ACCT #:                  [id number]  MRN:                     [id number]  :                      1950  ADMIT DATE:       2024 11:29 PM  DISCH DATE:        2024 3:50 PM  RESPONDING  PROVIDER #:        [id number]           QUERY TEXT:    Sepsis with SIRS of tachycardia and leukocytosis/ bandemia with associated organ dysfunction of lactic acid > 2 is documented in the Medical Record.  Lab Results show:   Neutrophils (absolute) 10.63 ->  Neutrophils (absolute) 6.80.   Lactic acid 1.0.   Vitals Flowsheet shows: - pulse rate 46-87.    After further study, can the diagnosis of sepsis be clarified by providing SIRS criteria and sepsis-related organ dysfunction?    Sepsis - real or suspected infection plus 2 or more SIRS criteria + organ dysfunction related to sepsis    Temp <96.8 or >101  HR >90  RR >20  WBC <4,000 or > 12,000 or >10% bandemia    The patient's Clinical Indicators include:  H&P and Progress Notes:  Sepsis POA: SIRS include tachycardia, leukocytosis, bandemia.   Clinical indicators of end organ dysfunction include Lactic Acid > 2.  Source is GI   WBC 11.4, Neutrophils (absolute) 10.63   WBC 9.4, Neutrophils (absolute) 6.80   Lactic Acid 1.0  Admit Vitals:  134/70, 73, 20, 97.9F, 95%; - pulse range: 46-87  Risk Factors: SBO  Treatment: Rocephin, Flagyl, IVF    Important Note:  please add your response to this query to your progress note if you agree    Thank you,  Seferino Moore RN, BSN, CCDS  Clinical   Connect via [facility]  Options provided:   -- Sepsis is ruled out   -- Sepsis confirmed (please clarify supportive SIRS and associated organ dysfunction), (clarify SIRS criteria plus sepsis-related organ  dysfunction)   -- Other explanation, Please specify      Query created by: Seferino Moore on 7/27/2024 7:44 AM    RESPONSE TEXT:    Sepsis is ruled out          Electronically signed by:  HAYLEE TALBOT MD 7/29/2024 5:45 PM

## 2024-08-06 NOTE — DISCHARGE SUMMARY
"Discharge Summary    CHIEF COMPLAINT ON ADMISSION  Chief Complaint   Patient presents with    Abdominal Pain     [facility], CT revealed bowel obstruction. NG tube placed, pt given 0.5 mg dilaudid, 2L IVF, and \"unknown antibiotics\" x 2 PTA.        Reason for Admission  ems     Admission Date  7/24/2024    CODE STATUS  Prior    HPI & HOSPITAL COURSE  This is a 74 y.o. male here with Abdominal Pain ([facility] with abd pain, CT revealed bowel obstruction. NG tube placed, pt given 0.5 mg dilaudid, 2L IVF, and \"unknown antibiotics\" x 2 PTA. )  Please review Dr. Dmitry Robertson M.D. notes for further details of history of present illness, past medical/social/family histories, allergies and medications. Please review Dr. Portillo, Surgery, Dr. Tapia, GI consultation notes.  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 He first presented with nausea, vomiting and abdominal pain. [facility], BUN 70, Cr- 1.6, lactic acid 2.5 Procalcitonin 0.07. CTA showed arkedly dilated distal bowel loops including dilatation of the iliac pouch increased from prior, with transition occurring at the distal pouch anastomotic suture, complex 1 cm left upper pole renal lesion suspicious for neoplasm such as a renal cell carcinoma. Patient given antibiotics. Surgery consulted awaiting CT images to load meanwhile patient made NPO for potential procedure.   Managing SBO, complicated surgery.   Nursing noted that patient was wanting surgical plan and was threatening to leave AMA. He had some output from his stoma. They mentioned that there has been normal output now. I spoke with patient and wife and deescalated the situation. " Despite normal output, we do not know the degree of obstruction and need for decompression. We reviewed the CT findings. I called surgeon. The team came at bedside and had now informed us that they had consulted the GI team regarding possible dilation. Meanwhile the surgical team reviewed the CT scan and explained to wife and patient regarding the degree of obstruction at the anastomotic site. Meanwhile, patient wanted to eat. I spoke to them that we will need to wait for GI as possible procedure may be done. I spoke with Dr. Tapia. On the scan the obstruction is actually quite iong that unlikely dilation may be done however given that there is still a lot of fluid collection in the intestines will still need an enteroscopy through the stoma for decompression. They will try their best to do it today. They recommend against advancing diet as this will worsen the obstruction. GI came at bedside and reviewed the enteroscopic findings. Patient's and wifes questions were answered to satisfaction.   S/p enteroscopic decompression drainage with Grimaldo by Dr. Tapia 7/27/2024. Cleared by GI. On empiric antibiotics, leukocytosis has resolved, 7/25 blood cultures NGTD. Postprocedure Afib on EKG ordered echo cmae back EF 60% RV normal, TSH, CHADSVAsc is 1-2 (if we count IBD as vasc disease) started anticoagulation (okayed by GI). Discussed with patient and wife, they are agreeable to anticoagulation. I started Eliquis. BB can be dosed to keep HR at least . If there are concerns for bradycardia, hypotension or dizziness, BB can be stopped and other alternative rate controlers can be used digoxin. Ultimately if patient develops sick sinus and only then can we consider pacer if he has symptomatic bradycardia or sustained HR<40. I explained cardioversion, need for anticoagulaiton first before cardioversion if patient doesn't spontaneously convert, which happens 50% or more of the time. I spoke to Dr. Espinoza, Cardiology who agreed  with the plan, if bradycardia asymptomatic OK to discharge, will need anticoagulation 4-6 weeks and plan outpatient cardioversion after that IF patient stillin Afib.     At discharge date, Babar Yuan afebrile and hemodynamically stable.  Babar Yuan wanted to be discharged today.        Imaging  EC-ECHOCARDIOGRAM COMPLETE W/O CONT   Final Result      DX-ABDOMEN FOR TUBE PLACEMENT   Final Result         1.  Left infrahilar infiltrates   2.  Atherosclerosis   3.  Air-filled distention of bowel in the upper abdomen, consider ileus and/or enteritis versus evolving obstructive changes.      OUTSIDE IMAGES-CT ABDOMEN /PELVIS   Final Result      OUTSIDE IMAGES-DX CHEST   Final Result                Therefore, he is discharged in fair and stable condition to home with organized home healthcare and close outpatient follow-up.    The patient met 2-midnight criteria for an inpatient stay at the time of discharge.    Discharge Date  7/29/2024    FOLLOW UP ITEMS POST DISCHARGE      DISCHARGE DIAGNOSES  Principal Problem:    SBO (small bowel obstruction) (HCC) (POA: Yes)  Active Problems:    IBD (inflammatory bowel disease) (POA: Unknown)    TRISTAN (acute kidney injury) (HCC) (POA: Unknown)    Sepsis (HCC) (POA: Unknown)    ACP (advance care planning) (POA: Unknown)    Renal lesion (POA: Unknown)    Atrial fibrillation with RVR (HCC) (POA: Unknown)      FOLLOW UP  No future appointments.  Pcp Pt States None        ssign and follow up with [facility]primary provider or discharge clinic physician in 1 week [facility], GI in 1 week postprocedure visit and IBD [facility]llow up wit [facility] in 1 week for stoma, BCIM [facility] with [facility] in 1 week for new Afib and anticoagulation. GI okayed starting anticoag) NURSING provide resources/pamphlets for Stoma care, Grimaldo catheter care, postprocedure instructions from Dr. Tapia, Afib/anticoagulation (monitor blood pressure, heart rate, bleeding, melena and renal function with provider),  narc use (Avoid swimming, driving or operating machinery. Treat constipation with prescribed bowel regimen), GI diet (small but frequent meals, chew food very well avoid big chunks or eating too fast, avoid dense food, avoid greasy or fatty foods, eat healthy and plenty of vegetables drunk of water avoid constipation)    MEDICATIONS ON DISCHARGE     Medication List        START taking these medications        Instructions   acetaminophen 325 MG Tabs  Commonly known as: Tylenol   Take 2 Tablets by mouth every 6 hours as needed for Moderate Pain or Mild Pain.  Dose: 650 mg     apixaban 5mg Tabs  Commonly known as: Eliquis   Take 1 Tablet by mouth 2 times a day. Indications: Thromboembolism secondary to Atrial Fibrillation  Dose: 5 mg     senna-docusate 8.6-50 MG Tabs  Commonly known as: Pericolace Or Senokot S   Take 2 Tablets by mouth every evening.  Dose: 2 Tablet            CONTINUE taking these medications        Instructions   acyclovir 400 MG tablet  Commonly known as: Zovirax   Take 400 mg by mouth 2 times a day.  Dose: 400 mg     buPROPion 100 MG Tabs  Commonly known as: Wellbutrin   Take 100 mg by mouth 2 times a day.  Dose: 100 mg     busPIRone 10 MG Tabs tablet  Commonly known as: Buspar   Take 10 mg by mouth 2 times a day.  Dose: 10 mg     LACTOBACILLUS PO   Take 1 Capsule by mouth every day.  Dose: 1 Capsule     omeprazole 20 MG delayed-release capsule  Commonly known as: PriLOSEC   Take 20 mg by mouth 2 times a day.  Dose: 20 mg     ondansetron 4 MG Tbdp  Commonly known as: Zofran ODT   Take 1 Tablet by mouth every 8 hours as needed for Nausea/Vomiting.  Dose: 4 mg     sildenafil citrate 100 MG tablet  Commonly known as: Viagra   Take 100 mg by mouth 1 time a day as needed for Erectile Dysfunction.  Dose: 100 mg     tamsulosin 0.4 MG capsule  Commonly known as: Flomax   Take 0.4 mg by mouth 1/2 hour after breakfast.  Dose: 0.4 mg     therapeutic multivitamin-minerals Tabs   Take 1 Tablet by mouth every  "day.  Dose: 1 Tablet     traZODone 100 MG Tabs  Commonly known as: Desyrel   Take 200 mg by mouth every evening. 200 mg = 2 tablets  Dose: 200 mg            ASK your doctor about these medications        Instructions   cefUROXime 500 MG Tabs  Commonly known as: Ceftin  Ask about: Should I take this medication?   Take 1 Tablet by mouth 2 times a day for 2 days.  Dose: 500 mg     metroNIDAZOLE 500 MG Tabs  Commonly known as: Flagyl  Ask about: Should I take this medication?   Take 1 Tablet by mouth every 12 hours for 2 days.  Dose: 500 mg              Allergies  Allergies   Allergen Reactions    Amoxicillin Unspecified     \"It makes my ostomy bleed\"       DIET  No orders of the defined types were placed in this encounter.      ACTIVITY  As per [facility]  CONSULTATIONS  See above    PROCEDURES  See Dr. Tapia's PROC NOTE    LABORATORY  Lab Results   Component Value Date    SODIUM 137 07/29/2024    POTASSIUM 3.2 (L) 07/29/2024    CHLORIDE 105 07/29/2024    CO2 23 07/29/2024    GLUCOSE 90 07/29/2024    BUN 11 07/29/2024    CREATININE 1.10 07/29/2024        Lab Results   Component Value Date    WBC 11.6 (H) 07/29/2024    HEMOGLOBIN 12.5 (L) 07/29/2024    HEMATOCRIT 37.5 (L) 07/29/2024    PLATELETCT 339 07/29/2024        Total time of the discharge process exceeds 45 minutes.  "

## (undated) DEVICE — KIT ANESTHESIA W/CIRCUIT & 3/LT BAG W/FILTER (20EA/CA)

## (undated) DEVICE — KIT CUSTOM PROCEDURE SINGLE FOR ENDO (15/CA)

## (undated) DEVICE — WATER IRRIGATION STERILE 1000ML (12EA/CA)

## (undated) DEVICE — SET LEADWIRE 5 LEAD BEDSIDE DISPOSABLE ECG (1SET OF 5/EA)

## (undated) DEVICE — MASK PANORAMIC OXYGEN PRO2 (30EA/CA)

## (undated) DEVICE — SENSOR OXIMETER ADULT SPO2 RD SET (20EA/BX)

## (undated) DEVICE — CANISTER SUCTION RIGID RED 1500CC (40EA/CA)

## (undated) DEVICE — BLOCK BITE MAXI DENTAL RETENTION RIM (100EA/BX)

## (undated) DEVICE — MASK ANESTHESIA ADULT - (100/CA)

## (undated) DEVICE — LACTATED RINGERS INJ 1000 ML - (14EA/CA 60CA/PF)

## (undated) DEVICE — TUBE E-T HI-LO CUFF 8.0MM (10EA/PK)

## (undated) DEVICE — BAG DRAINAGE URINARY CLOSED 2000ML (20EA/CA)

## (undated) DEVICE — ELECTRODE 850 FOAM ADHESIVE - HYDROGEL RADIOTRNSPRNT (50/PK)

## (undated) DEVICE — PORT AUXILLARY WATER (50EA/BX)

## (undated) DEVICE — TUBE CONNECTING SUCTION - CLEAR PLASTIC STERILE 72 IN (50EA/CA)

## (undated) DEVICE — WIRE JAG .038/260 - 2/BX

## (undated) DEVICE — Device

## (undated) DEVICE — BUTTON ENDOSCOPY DISPOSABLE

## (undated) DEVICE — FILM CASSETTE ENDO

## (undated) DEVICE — NEPTUNE 4 PORT MANIFOLD - (20/PK)

## (undated) DEVICE — SODIUM CHL IRRIGATION 0.9% 1000ML (12EA/CA)